# Patient Record
Sex: FEMALE | Race: WHITE | Employment: FULL TIME | URBAN - NONMETROPOLITAN AREA
[De-identification: names, ages, dates, MRNs, and addresses within clinical notes are randomized per-mention and may not be internally consistent; named-entity substitution may affect disease eponyms.]

---

## 2022-10-02 ENCOUNTER — HOSPITAL ENCOUNTER (INPATIENT)
Age: 27
LOS: 3 days | Discharge: HOME | DRG: 552 | End: 2022-10-07
Attending: ORTHOPAEDIC SURGERY | Admitting: ORTHOPAEDIC SURGERY
Payer: COMMERCIAL

## 2022-10-02 ENCOUNTER — APPOINTMENT (OUTPATIENT)
Dept: MRI IMAGING | Age: 27
DRG: 552 | End: 2022-10-02
Payer: COMMERCIAL

## 2022-10-02 DIAGNOSIS — M54.59 INTRACTABLE LOW BACK PAIN: Primary | ICD-10-CM

## 2022-10-02 LAB
ANION GAP SERPL CALCULATED.3IONS-SCNC: 14 MEQ/L (ref 8–16)
BACTERIA: ABNORMAL /HPF
BASOPHILS # BLD: 0.1 %
BASOPHILS ABSOLUTE: 0 THOU/MM3 (ref 0–0.1)
BILIRUBIN URINE: NEGATIVE
BLOOD, URINE: ABNORMAL
BUN BLDV-MCNC: 12 MG/DL (ref 7–22)
C-REACTIVE PROTEIN: 1.93 MG/DL (ref 0–1)
CALCIUM SERPL-MCNC: 9 MG/DL (ref 8.5–10.5)
CASTS 2: ABNORMAL /LPF
CASTS UA: ABNORMAL /LPF
CHARACTER, URINE: CLEAR
CHLORIDE BLD-SCNC: 100 MEQ/L (ref 98–111)
CO2: 22 MEQ/L (ref 23–33)
COLOR: YELLOW
CREAT SERPL-MCNC: 0.5 MG/DL (ref 0.4–1.2)
CRYSTALS, UA: ABNORMAL
EOSINOPHIL # BLD: 1.3 %
EOSINOPHILS ABSOLUTE: 0.1 THOU/MM3 (ref 0–0.4)
EPITHELIAL CELLS, UA: ABNORMAL /HPF
ERYTHROCYTE [DISTWIDTH] IN BLOOD BY AUTOMATED COUNT: 12.8 % (ref 11.5–14.5)
ERYTHROCYTE [DISTWIDTH] IN BLOOD BY AUTOMATED COUNT: 40.3 FL (ref 35–45)
GFR SERPL CREATININE-BSD FRML MDRD: > 90 ML/MIN/1.73M2
GLUCOSE BLD-MCNC: 171 MG/DL (ref 70–108)
GLUCOSE BLD-MCNC: 300 MG/DL (ref 70–108)
GLUCOSE URINE: NEGATIVE MG/DL
HCT VFR BLD CALC: 39 % (ref 37–47)
HEMOGLOBIN: 13.6 GM/DL (ref 12–16)
IMMATURE GRANS (ABS): 0.04 THOU/MM3 (ref 0–0.07)
IMMATURE GRANULOCYTES: 0.4 %
KETONES, URINE: ABNORMAL
LEUKOCYTE ESTERASE, URINE: NEGATIVE
LYMPHOCYTES # BLD: 15.7 %
LYMPHOCYTES ABSOLUTE: 1.4 THOU/MM3 (ref 1–4.8)
MAGNESIUM: 1.7 MG/DL (ref 1.6–2.4)
MCH RBC QN AUTO: 30.4 PG (ref 26–33)
MCHC RBC AUTO-ENTMCNC: 34.9 GM/DL (ref 32.2–35.5)
MCV RBC AUTO: 87.2 FL (ref 81–99)
MISCELLANEOUS 2: ABNORMAL
MONOCYTES # BLD: 3.4 %
MONOCYTES ABSOLUTE: 0.3 THOU/MM3 (ref 0.4–1.3)
NITRITE, URINE: NEGATIVE
NUCLEATED RED BLOOD CELLS: 0 /100 WBC
OSMOLALITY CALCULATION: 275.7 MOSMOL/KG (ref 275–300)
PH UA: 6.5 (ref 5–9)
PLATELET # BLD: 245 THOU/MM3 (ref 130–400)
PMV BLD AUTO: 9.4 FL (ref 9.4–12.4)
POTASSIUM SERPL-SCNC: 3.9 MEQ/L (ref 3.5–5.2)
PROTEIN UA: ABNORMAL
RBC # BLD: 4.47 MILL/MM3 (ref 4.2–5.4)
RBC URINE: ABNORMAL /HPF
RENAL EPITHELIAL, UA: ABNORMAL
SEDIMENTATION RATE, ERYTHROCYTE: 18 MM/HR (ref 0–20)
SEG NEUTROPHILS: 79.1 %
SEGMENTED NEUTROPHILS ABSOLUTE COUNT: 7.2 THOU/MM3 (ref 1.8–7.7)
SODIUM BLD-SCNC: 136 MEQ/L (ref 135–145)
SPECIFIC GRAVITY, URINE: 1.01 (ref 1–1.03)
UROBILINOGEN, URINE: 1 EU/DL (ref 0–1)
WBC # BLD: 9.1 THOU/MM3 (ref 4.8–10.8)
WBC UA: ABNORMAL /HPF
YEAST: ABNORMAL

## 2022-10-02 PROCEDURE — 36415 COLL VENOUS BLD VENIPUNCTURE: CPT

## 2022-10-02 PROCEDURE — G0378 HOSPITAL OBSERVATION PER HR: HCPCS

## 2022-10-02 PROCEDURE — 6360000002 HC RX W HCPCS: Performed by: PHYSICIAN ASSISTANT

## 2022-10-02 PROCEDURE — 96372 THER/PROPH/DIAG INJ SC/IM: CPT

## 2022-10-02 PROCEDURE — 99285 EMERGENCY DEPT VISIT HI MDM: CPT

## 2022-10-02 PROCEDURE — A9579 GAD-BASE MR CONTRAST NOS,1ML: HCPCS | Performed by: PHYSICIAN ASSISTANT

## 2022-10-02 PROCEDURE — 2580000003 HC RX 258: Performed by: PHYSICIAN ASSISTANT

## 2022-10-02 PROCEDURE — 80048 BASIC METABOLIC PNL TOTAL CA: CPT

## 2022-10-02 PROCEDURE — 81001 URINALYSIS AUTO W/SCOPE: CPT

## 2022-10-02 PROCEDURE — 99253 IP/OBS CNSLTJ NEW/EST LOW 45: CPT | Performed by: PHYSICIAN ASSISTANT

## 2022-10-02 PROCEDURE — 96374 THER/PROPH/DIAG INJ IV PUSH: CPT

## 2022-10-02 PROCEDURE — 96375 TX/PRO/DX INJ NEW DRUG ADDON: CPT

## 2022-10-02 PROCEDURE — 86140 C-REACTIVE PROTEIN: CPT

## 2022-10-02 PROCEDURE — 6360000004 HC RX CONTRAST MEDICATION: Performed by: PHYSICIAN ASSISTANT

## 2022-10-02 PROCEDURE — 85025 COMPLETE CBC W/AUTO DIFF WBC: CPT

## 2022-10-02 PROCEDURE — 72158 MRI LUMBAR SPINE W/O & W/DYE: CPT

## 2022-10-02 PROCEDURE — 82948 REAGENT STRIP/BLOOD GLUCOSE: CPT

## 2022-10-02 PROCEDURE — 93005 ELECTROCARDIOGRAM TRACING: CPT | Performed by: PHYSICIAN ASSISTANT

## 2022-10-02 PROCEDURE — 96361 HYDRATE IV INFUSION ADD-ON: CPT

## 2022-10-02 PROCEDURE — 6370000000 HC RX 637 (ALT 250 FOR IP): Performed by: PHYSICIAN ASSISTANT

## 2022-10-02 PROCEDURE — 85651 RBC SED RATE NONAUTOMATED: CPT

## 2022-10-02 PROCEDURE — 83735 ASSAY OF MAGNESIUM: CPT

## 2022-10-02 RX ORDER — KETOROLAC TROMETHAMINE 30 MG/ML
30 INJECTION, SOLUTION INTRAMUSCULAR; INTRAVENOUS ONCE
Status: COMPLETED | OUTPATIENT
Start: 2022-10-02 | End: 2022-10-02

## 2022-10-02 RX ORDER — PANTOPRAZOLE SODIUM 40 MG/1
40 TABLET, DELAYED RELEASE ORAL
Status: DISCONTINUED | OUTPATIENT
Start: 2022-10-03 | End: 2022-10-07 | Stop reason: HOSPADM

## 2022-10-02 RX ORDER — MORPHINE SULFATE 2 MG/ML
2 INJECTION, SOLUTION INTRAMUSCULAR; INTRAVENOUS
Status: DISCONTINUED | OUTPATIENT
Start: 2022-10-02 | End: 2022-10-03

## 2022-10-02 RX ORDER — OXYCODONE HYDROCHLORIDE AND ACETAMINOPHEN 5; 325 MG/1; MG/1
1 TABLET ORAL EVERY 4 HOURS PRN
Status: DISCONTINUED | OUTPATIENT
Start: 2022-10-02 | End: 2022-10-07 | Stop reason: HOSPADM

## 2022-10-02 RX ORDER — SODIUM CHLORIDE 0.9 % (FLUSH) 0.9 %
5-40 SYRINGE (ML) INJECTION EVERY 12 HOURS SCHEDULED
Status: DISCONTINUED | OUTPATIENT
Start: 2022-10-02 | End: 2022-10-07 | Stop reason: HOSPADM

## 2022-10-02 RX ORDER — MORPHINE SULFATE 4 MG/ML
4 INJECTION, SOLUTION INTRAMUSCULAR; INTRAVENOUS
Status: DISCONTINUED | OUTPATIENT
Start: 2022-10-02 | End: 2022-10-03

## 2022-10-02 RX ORDER — SODIUM CHLORIDE 9 MG/ML
INJECTION, SOLUTION INTRAVENOUS CONTINUOUS
Status: DISCONTINUED | OUTPATIENT
Start: 2022-10-02 | End: 2022-10-07 | Stop reason: HOSPADM

## 2022-10-02 RX ORDER — 0.9 % SODIUM CHLORIDE 0.9 %
1000 INTRAVENOUS SOLUTION INTRAVENOUS ONCE
Status: COMPLETED | OUTPATIENT
Start: 2022-10-02 | End: 2022-10-02

## 2022-10-02 RX ORDER — POLYETHYLENE GLYCOL 3350 17 G/17G
17 POWDER, FOR SOLUTION ORAL DAILY PRN
Status: DISCONTINUED | OUTPATIENT
Start: 2022-10-02 | End: 2022-10-07 | Stop reason: HOSPADM

## 2022-10-02 RX ORDER — DEXAMETHASONE SODIUM PHOSPHATE 4 MG/ML
10 INJECTION, SOLUTION INTRA-ARTICULAR; INTRALESIONAL; INTRAMUSCULAR; INTRAVENOUS; SOFT TISSUE ONCE
Status: COMPLETED | OUTPATIENT
Start: 2022-10-02 | End: 2022-10-02

## 2022-10-02 RX ORDER — SODIUM CHLORIDE 9 MG/ML
INJECTION, SOLUTION INTRAVENOUS PRN
Status: DISCONTINUED | OUTPATIENT
Start: 2022-10-02 | End: 2022-10-07 | Stop reason: HOSPADM

## 2022-10-02 RX ORDER — METOPROLOL SUCCINATE 50 MG/1
50 TABLET, EXTENDED RELEASE ORAL DAILY
COMMUNITY

## 2022-10-02 RX ORDER — AMLODIPINE BESYLATE 5 MG/1
5 TABLET ORAL DAILY
Status: DISCONTINUED | OUTPATIENT
Start: 2022-10-03 | End: 2022-10-07 | Stop reason: HOSPADM

## 2022-10-02 RX ORDER — ONDANSETRON 2 MG/ML
4 INJECTION INTRAMUSCULAR; INTRAVENOUS EVERY 6 HOURS PRN
Status: DISCONTINUED | OUTPATIENT
Start: 2022-10-02 | End: 2022-10-07 | Stop reason: HOSPADM

## 2022-10-02 RX ORDER — OXYCODONE HYDROCHLORIDE AND ACETAMINOPHEN 5; 325 MG/1; MG/1
1 TABLET ORAL EVERY 6 HOURS PRN
Status: ON HOLD | COMMUNITY
End: 2022-10-07 | Stop reason: HOSPADM

## 2022-10-02 RX ORDER — METOPROLOL SUCCINATE 50 MG/1
50 TABLET, EXTENDED RELEASE ORAL DAILY
Status: DISCONTINUED | OUTPATIENT
Start: 2022-10-03 | End: 2022-10-07 | Stop reason: HOSPADM

## 2022-10-02 RX ORDER — LISINOPRIL 10 MG/1
10 TABLET ORAL DAILY
Status: DISCONTINUED | OUTPATIENT
Start: 2022-10-03 | End: 2022-10-07 | Stop reason: HOSPADM

## 2022-10-02 RX ORDER — AMLODIPINE BESYLATE 5 MG/1
5 TABLET ORAL DAILY
COMMUNITY

## 2022-10-02 RX ORDER — ONDANSETRON 4 MG/1
4 TABLET, ORALLY DISINTEGRATING ORAL EVERY 8 HOURS PRN
Status: DISCONTINUED | OUTPATIENT
Start: 2022-10-02 | End: 2022-10-07 | Stop reason: HOSPADM

## 2022-10-02 RX ORDER — OXYCODONE HYDROCHLORIDE AND ACETAMINOPHEN 5; 325 MG/1; MG/1
2 TABLET ORAL EVERY 4 HOURS PRN
Status: DISCONTINUED | OUTPATIENT
Start: 2022-10-02 | End: 2022-10-07 | Stop reason: HOSPADM

## 2022-10-02 RX ORDER — LISINOPRIL 10 MG/1
10 TABLET ORAL DAILY
COMMUNITY

## 2022-10-02 RX ORDER — OMEPRAZOLE 20 MG/1
20 CAPSULE, DELAYED RELEASE ORAL DAILY
COMMUNITY

## 2022-10-02 RX ORDER — SODIUM CHLORIDE 0.9 % (FLUSH) 0.9 %
5-40 SYRINGE (ML) INJECTION PRN
Status: DISCONTINUED | OUTPATIENT
Start: 2022-10-02 | End: 2022-10-07 | Stop reason: HOSPADM

## 2022-10-02 RX ORDER — ENOXAPARIN SODIUM 100 MG/ML
40 INJECTION SUBCUTANEOUS 2 TIMES DAILY
Status: DISCONTINUED | OUTPATIENT
Start: 2022-10-02 | End: 2022-10-07 | Stop reason: HOSPADM

## 2022-10-02 RX ORDER — CYCLOBENZAPRINE HCL 10 MG
10 TABLET ORAL 3 TIMES DAILY PRN
Status: DISCONTINUED | OUTPATIENT
Start: 2022-10-02 | End: 2022-10-03

## 2022-10-02 RX ORDER — MORPHINE SULFATE 4 MG/ML
4 INJECTION, SOLUTION INTRAMUSCULAR; INTRAVENOUS ONCE
Status: COMPLETED | OUTPATIENT
Start: 2022-10-02 | End: 2022-10-02

## 2022-10-02 RX ORDER — DIAZEPAM 5 MG/ML
5 INJECTION, SOLUTION INTRAMUSCULAR; INTRAVENOUS ONCE
Status: COMPLETED | OUTPATIENT
Start: 2022-10-02 | End: 2022-10-02

## 2022-10-02 RX ADMIN — CYCLOBENZAPRINE 10 MG: 10 TABLET, FILM COATED ORAL at 21:28

## 2022-10-02 RX ADMIN — DIAZEPAM 5 MG: 5 INJECTION, SOLUTION INTRAMUSCULAR; INTRAVENOUS at 13:53

## 2022-10-02 RX ADMIN — GADOTERIDOL 20 ML: 279.3 INJECTION, SOLUTION INTRAVENOUS at 15:59

## 2022-10-02 RX ADMIN — KETOROLAC TROMETHAMINE 30 MG: 30 INJECTION, SOLUTION INTRAMUSCULAR; INTRAVENOUS at 13:53

## 2022-10-02 RX ADMIN — SODIUM CHLORIDE: 9 INJECTION, SOLUTION INTRAVENOUS at 19:10

## 2022-10-02 RX ADMIN — OXYCODONE AND ACETAMINOPHEN 2 TABLET: 5; 325 TABLET ORAL at 21:28

## 2022-10-02 RX ADMIN — ENOXAPARIN SODIUM 40 MG: 100 INJECTION SUBCUTANEOUS at 20:20

## 2022-10-02 RX ADMIN — DEXAMETHASONE SODIUM PHOSPHATE 10 MG: 4 INJECTION, SOLUTION INTRAMUSCULAR; INTRAVENOUS at 13:51

## 2022-10-02 RX ADMIN — MORPHINE SULFATE 4 MG: 4 INJECTION, SOLUTION INTRAMUSCULAR; INTRAVENOUS at 17:13

## 2022-10-02 RX ADMIN — SODIUM CHLORIDE 1000 ML: 9 INJECTION, SOLUTION INTRAVENOUS at 13:51

## 2022-10-02 ASSESSMENT — PAIN SCALES - GENERAL
PAINLEVEL_OUTOF10: 6
PAINLEVEL_OUTOF10: 7
PAINLEVEL_OUTOF10: 7
PAINLEVEL_OUTOF10: 4
PAINLEVEL_OUTOF10: 3
PAINLEVEL_OUTOF10: 6
PAINLEVEL_OUTOF10: 4

## 2022-10-02 ASSESSMENT — PAIN DESCRIPTION - DESCRIPTORS
DESCRIPTORS: SHOOTING
DESCRIPTORS: SHOOTING

## 2022-10-02 ASSESSMENT — PAIN DESCRIPTION - ONSET
ONSET: ON-GOING
ONSET: ON-GOING

## 2022-10-02 ASSESSMENT — PAIN DESCRIPTION - PAIN TYPE
TYPE: ACUTE PAIN
TYPE: ACUTE PAIN

## 2022-10-02 ASSESSMENT — PAIN DESCRIPTION - ORIENTATION
ORIENTATION: LEFT;LOWER
ORIENTATION: LOWER;LEFT
ORIENTATION: LEFT;LOWER

## 2022-10-02 ASSESSMENT — ENCOUNTER SYMPTOMS
VOMITING: 0
DIARRHEA: 0
EYE PAIN: 0
ABDOMINAL PAIN: 0
NAUSEA: 0
SHORTNESS OF BREATH: 0
COUGH: 0
STRIDOR: 0
BACK PAIN: 1

## 2022-10-02 ASSESSMENT — PAIN - FUNCTIONAL ASSESSMENT
PAIN_FUNCTIONAL_ASSESSMENT: 0-10
PAIN_FUNCTIONAL_ASSESSMENT: 0-10
PAIN_FUNCTIONAL_ASSESSMENT: PREVENTS OR INTERFERES SOME ACTIVE ACTIVITIES AND ADLS
PAIN_FUNCTIONAL_ASSESSMENT: 0-10
PAIN_FUNCTIONAL_ASSESSMENT: ACTIVITIES ARE NOT PREVENTED

## 2022-10-02 ASSESSMENT — PAIN DESCRIPTION - LOCATION
LOCATION: BACK
LOCATION: BACK
LOCATION: BACK;LEG

## 2022-10-02 ASSESSMENT — PAIN DESCRIPTION - FREQUENCY
FREQUENCY: INTERMITTENT
FREQUENCY: INTERMITTENT

## 2022-10-02 ASSESSMENT — PAIN DESCRIPTION - DIRECTION
RADIATING_TOWARDS: DOWN LEFT LEG
RADIATING_TOWARDS: LEFT LEG

## 2022-10-02 NOTE — LETTER
Weston Vega  Phone: [phone]    No name on file. October 6, 2022     Patient: Kimberley Mc   YOB: 1995   Date of Visit: 10/2/2022       To Whom It May Concern: It is my medical opinion that Sulma Magana may return to work on 10/10/22. She was admitted to the hospital under the care of Dr. Emmanuel Mojica from the dates 10/2/22 to 10/6/22    If you have any questions or concerns, please don't hesitate to call. Sincerely,        No name on file.

## 2022-10-02 NOTE — ED NOTES
In to update patient on status of MRI, extra pillow provided for comfort.       Jovita Fournier, RN  10/02/22 9617

## 2022-10-02 NOTE — ED TRIAGE NOTES
Pt in through ED lobby. She has had lower left back pain going down her left leg since Tuesday. She has some numbness and tingling in her left leg. She was seen at [facility] ER this morning where they gave her valium, dilaudid, droperidol, and toradol. This helped the pain and brought it from a 9/10 to a 4/10. She was sent home with percocet and methylprednisolone. She was told to follow up with Dr. Cecelia Connolly. She is coming to ER to get the process started with Dr. Cecelia Connolly.  She did have a surgery with [facility] on 2013 for herniated disc at L4, L5, and S1.

## 2022-10-02 NOTE — ED NOTES
Pt ambulatory to the bathroom with no assistance from RN for urine sample      Lindy Balderrama RN  10/02/22 3582

## 2022-10-02 NOTE — ED PROVIDER NOTES
Weston Vega  Phone: [phone]       Chief Complaint   Patient presents with    Back Pain    Leg Pain       Nurses Notes reviewed and I agree except as notedin the HPI. HISTORY OF PRESENT ILLNESS    Marichuy Stewart is a 32 y.o. female who presents complains of low back pain that is been going on since Thursday. She has pain going to her left leg with numbness and tingling. She says that she is having difficulty moving her left foot. She has no bowel or bladder incontinence. She was seen at [facility] emergency department today and was told if he went to see Dr. Jero Crain and her her best bet would be to be to come to the [facility] emergency department. She was discharged home with a Medrol Dosepak and Percocet it was here because of persistent pain. They came in to see if they can get established with Dr. Jero Crain faster. REVIEW OF SYSTEMS     Review of Systems   Constitutional:  Negative for chills and fever. HENT:  Negative for congestion and tinnitus. Eyes:  Negative for pain. Respiratory:  Negative for cough, shortness of breath and stridor. Cardiovascular:  Negative for chest pain and palpitations. Gastrointestinal:  Negative for abdominal pain, diarrhea, nausea and vomiting. Genitourinary:  Negative for dysuria and urgency. Musculoskeletal:  Positive for back pain. Negative for myalgias and neck pain. Skin:  Negative for rash. Neurological:  Negative for dizziness. Psychiatric/Behavioral:  Negative for suicidal ideas. All other systems reviewed and are negative. PAST MEDICAL HISTORY    has a past medical history of Diabetes mellitus ([address] Hypercholesteremia, Hypertension, and Lumbar radiculopathy. SURGICAL HISTORY      has a past surgical history that includes Ovarian cyst surgery (01/01/2005); back surgery (Left, 03/18/2013);  Bastrop tooth extraction (Bilateral); and Tympanostomy tube placement. CURRENT MEDICATIONS       Current Discharge Medication List        CONTINUE these medications which have NOT CHANGED    Details   omeprazole (PRILOSEC) 20 MG delayed release capsule Take 20 mg by mouth Daily      metFORMIN (GLUCOPHAGE) 500 MG tablet Take 500 mg by mouth 2 times daily (with meals)      amLODIPine (NORVASC) 5 MG tablet Take 5 mg by mouth daily      lisinopril (PRINIVIL;ZESTRIL) 10 MG tablet Take 10 mg by mouth daily      metoprolol succinate (TOPROL XL) 50 MG extended release tablet Take 50 mg by mouth daily      oxyCODONE-acetaminophen (PERCOCET) 5-325 MG per tablet Take 1 tablet by mouth every 6 hours as needed for Pain. ALLERGIES     has No Known Allergies. HISTORY     She indicated that her mother is alive. She indicated that her father is alive. She indicated that the status of her maternal grandmother is unknown. She indicated that the status of her maternal grandfather is unknown. She indicated that the status of her paternal grandmother is unknown. She indicated that the status of her maternal uncle is unknown.   family history includes Arthritis in her maternal grandfather; Cancer in her maternal grandmother and paternal grandmother; Diabetes in her maternal grandmother and maternal uncle; Heart Disease in her maternal grandmother; High Blood Pressure in her maternal grandmother; High Cholesterol in her maternal grandmother. SOCIALHISTORY      reports that she quit smoking about 12 years ago. Her smoking use included cigarettes. She has a 6.00 pack-year smoking history. She has never used smokeless tobacco. She reports current alcohol use. She reports that she does not use drugs. PHYSICAL EXAM     INITIAL VITALS:  height is 5' 9.5\" (1.765 m) and weight is 343 lb 4.8 oz (155.7 kg) (abnormal). Her axillary temperature is 97.9 °F (36.6 °C). Her blood pressure is 164/100 (abnormal) and her pulse is 98. Her respiration is 18 and oxygen saturation is 97%. Physical Exam  Vitals and nursing note reviewed. HENT:      Head: Normocephalic and atraumatic. Eyes:      Pupils: Pupils are equal, round, and reactive to light. Neck:      Thyroid: No thyromegaly. Trachea: No tracheal deviation. Cardiovascular:      Rate and Rhythm: Normal rate and regular rhythm. Heart sounds: No murmur heard. No friction rub. Pulmonary:      Effort: Pulmonary effort is normal. No respiratory distress. Breath sounds: Normal breath sounds. No wheezing. Abdominal:      General: Bowel sounds are normal. There is no distension. Palpations: Abdomen is soft. Tenderness: There is no abdominal tenderness. Musculoskeletal:      Cervical back: Neck supple. Comments: Tenderness left paraspinous muscles with good strength and sensation lower extremity. Skin:     General: Skin is warm and dry. Findings: No erythema or rash. Neurological:      Mental Status: She is alert and oriented to person, place, and time. DIFFERENTIAL DIAGNOSIS:   Lumbar strain with radiation to left leg. She is having tachycardia and difficulty with her left foot I will get an MRI of her lumbar spine. DIAGNOSTIC RESULTS     EKG: All EKG's are interpreted by the Emergency Department Physician who either signs or Co-signs this chart in the absence of a cardiologist.      RADIOLOGY: non-plain film images(s) such as CT, Ultrasound and MRI are read by the radiologist.  MRI [address] [place] [address] Dr GOODWIN   Final Result       1. Postoperative changes at L5-S1. Recurrent 12 mm ventral and left-sided disc extrusion at this level resulting in mild-to-moderate canal, moderate to severe left and mild-to-moderate right-sided foraminal stenosis. 2. Possible postoperative changes at L4-5. There is a 5.2 mm ventral and left-sided extradural defect resulting in mild-to-moderate canal, moderate left and mild-to-moderate right-sided foraminal stenosis.    3. 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   4. There is mild-to-moderate canal and bilateral foraminal stenosis at L3-4.   5. There is mild canal and mild-to-moderate bilateral foraminal stenosis at L2-3.   6. There is degenerative change involving the sacroiliac joints bilaterally. **This report has been created using voice recognition software. It may contain minor errors which are inherent in voice recognition technology. **      Final report electronically signed by DR Reid Berad on 10/3/2022 8:51 AM            LABS:   Labs Reviewed   CBC WITH AUTO DIFFERENTIAL - Abnormal; Notable for the following components:       Result Value    Monocytes Absolute 0.3 (*)     All other components within normal limits   BASIC METABOLIC PANEL - Abnormal; Notable for the following components:    CO2 22 (*)     Glucose 171 (*)     All other components within normal limits   C-REACTIVE PROTEIN - Abnormal; Notable for the following components:    CRP 1.93 (*)     All other components within normal limits   URINE WITH REFLEXED MICRO - Abnormal; Notable for the following components:    Ketones, Urine TRACE (*)     Blood, Urine TRACE (*)     Protein, UA TRACE (*)     All other components within normal limits   CBC WITH AUTO DIFFERENTIAL - Abnormal; Notable for the following components:    MPV 9.3 (*)     All other components within normal limits   BASIC METABOLIC PANEL W/ REFLEX TO MG FOR LOW K - Abnormal; Notable for the following components:    CO2 22 (*)     Glucose 169 (*)     All other components within normal limits   POCT GLUCOSE - Abnormal; Notable for the following components:    POC Glucose 300 (*)     All other components within normal limits   POCT GLUCOSE - Abnormal; Notable for the following components:    POC Glucose 174 (*)     All other components within normal limits   POCT GLUCOSE - Abnormal; Notable for the following components:    POC Glucose 175 (*)     All other components within normal limits   POCT GLUCOSE - Abnormal; Notable for the following components:    POC Glucose 193 (*)     All other components within normal limits   POCT GLUCOSE - Abnormal; Notable for the following components:    POC Glucose 192 (*)     All other components within normal limits   POCT GLUCOSE - Abnormal; Notable for the following components:    POC Glucose 147 (*)     All other components within normal limits   POCT GLUCOSE - Abnormal; Notable for the following components:    POC Glucose 145 (*)     All other components within normal limits   POCT GLUCOSE - Abnormal; Notable for the following components:    POC Glucose 137 (*)     All other components within normal limits   POCT GLUCOSE - Abnormal; Notable for the following components:    POC Glucose 157 (*)     All other components within normal limits   POCT GLUCOSE - Abnormal; Notable for the following components:    POC Glucose 130 (*)     All other components within normal limits   POCT GLUCOSE - Abnormal; Notable for the following components:    POC Glucose 132 (*)     All other components within normal limits   POCT GLUCOSE - Abnormal; Notable for the following components:    POC Glucose 127 (*)     All other components within normal limits   MAGNESIUM   SEDIMENTATION RATE   ANION GAP   OSMOLALITY   GLOMERULAR FILTRATION RATE, ESTIMATED   ANION GAP   GLOMERULAR FILTRATION RATE, ESTIMATED       EMERGENCY DEPARTMENT COURSE:   :    Vitals:    10/05/22 0351 10/05/22 0830 10/05/22 1715 10/05/22 1952   BP: (!) 151/120 (!) 146/90 (!) 156/97 (!) 164/100   Pulse: (!) 105 100 97 98   Resp: 18 16 16 18   Temp: 97.5 °F (36.4 °C) 97.9 °F (36.6 °C) 97.9 °F (36.6 °C)    TempSrc: Oral Oral Axillary    SpO2: 97% 97% 98% 97%   Weight:       Height:         Patient was seen history physical exam was performed. Patient was given Toradol, Decadron, and Valium 5 mg IV. Patient was given IV fluids. Patient still having some significant pain.   I did consult the patient's orthopedic spine surgeon and he did offer admission for control and epidural injection. Patient tachycardia did improve somewhat but then got worse and we do feel is pain related. Patient is go to be admitted. See disposition below    CRITICAL CARE:  None    CONSULTS:  Dr Kohli Dec:  None    FINAL IMPRESSION      1. Intractable low back pain          DISPOSITION/PLAN   Admit    PATIENT REFERRED TO:  No follow-up provider specified.     DISCHARGE MEDICATIONS:  Current Discharge Medication List          (Please note that portions of this note were completed with a voice recognitionprogram.  Efforts were made to edit the dictations but occasionally words are mis-transcribed.)    Lethaniel Frankel, PA Lethaniel Frankel, PA  10/02/22 [address] [place] [address], PA  10/05/22 2011

## 2022-10-02 NOTE — CONSULTS
Hospitalist Consult Note        Patient:  Zenobia Riggs  YOB: 1995  Date of Service: 10/2/2022  MRN: [id number]   Acct:  [de-identified]   Primary Care Physician: Ethan Castellanos DO    Chief Complaint:  acute on chronic low back pain  Reason for consult  pre op risk assessment    Date of Service: Pt seen/examined in consultation on 10/2/2022     History Of Present Illness:      Marcell Newness y.o. female who we are asked to see/evaluate by Luh Mccain MD for medical management of hypertension and obesity. Patient presents to the ED with gradually worsening low back pain with radiation down the left leg. Patient states she has had low back pain since she was 10years old. Assessment and Plan:-  Acute on chronic low back pain:   Primary to manage  Essential hypertension:  Continue home medications  Monitor for hypotension with opiate administration  Morbid Obesity  Calorie control diet  Patient states she was tested for sleep apnea and doesn't have it  Pre op risk assessment:  Revised Cardiac Risk Index is 0 or Class I Risk  NSQIP indicates she is below average risk for post operative complications  Suggest early ambulation, incentive spirometry, med nebs, and aggressive DVT prophylaxis post operatively  Patient is a low risk for surgery. Past Medical History:        Diagnosis Date    Diabetes mellitus ([address]     Hypercholesteremia     Hypertension        Past Surgical History:        Procedure Laterality Date    BACK SURGERY Left 03/18/2013    Left L4-5 & L5-S1 microdisectomy    OVARIAN CYST SURGERY  01/01/2005    & CYST OFF FALLIOPIAN TUBE    TYMPANOSTOMY TUBE PLACEMENT      WISDOM TOOTH EXTRACTION Bilateral        Home Medications:   No current facility-administered medications on file prior to encounter.      Current Outpatient Medications on File Prior to Encounter   Medication Sig Dispense Refill    omeprazole (PRILOSEC) 20 MG delayed release capsule Take 20 mg by mouth Daily      metFORMIN (GLUCOPHAGE) 500 MG tablet Take 500 mg by mouth 2 times daily (with meals)      amLODIPine (NORVASC) 5 MG tablet Take 5 mg by mouth daily      lisinopril (PRINIVIL;ZESTRIL) 10 MG tablet Take 10 mg by mouth daily      metoprolol succinate (TOPROL XL) 50 MG extended release tablet Take 50 mg by mouth daily      oxyCODONE-acetaminophen (PERCOCET) 5-325 MG per tablet Take 1 tablet by mouth every 6 hours as needed for Pain. Allergies:    Patient has no known allergies. Social History:    reports that she quit smoking about 12 years ago. Her smoking use included cigarettes. She has a 6.00 pack-year smoking history. She has never used smokeless tobacco. She reports current alcohol use. She reports that she does not use drugs. Family History:       Problem Relation Age of Onset    Diabetes Maternal Uncle     Cancer Maternal Grandmother     Diabetes Maternal Grandmother     Heart Disease Maternal Grandmother     High Blood Pressure Maternal Grandmother     High Cholesterol Maternal Grandmother     Arthritis Maternal Grandfather     Cancer Paternal Grandmother        Diet:  ADULT DIET; Regular; 5 carb choices (75 gm/meal)    Review of systems:   Pertinent positives as noted in the HPI. All other systems reviewed and negative. PHYSICAL EXAM:  BP (!) 163/106   Pulse (!) 113   Temp 98.2 °F (36.8 °C) (Oral)   Resp 20   Ht 5' 9.5\" (1.765 m)   Wt (!) 343 lb 4.8 oz (155.7 kg)   SpO2 95%   BMI 49.97 kg/m²   General appearance: No apparent distress, appears stated age and cooperative. Obese  HEENT: Normal cephalic, atraumatic without obvious deformity. Pupils equal, round, and reactive to light. Extra ocular muscles intact. Conjunctivae/corneas clear. Neck: Supple, with full range of motion. No jugular venous distention. Trachea midline. Respiratory:  Normal respiratory effort. Clear to auscultation, bilaterally without Rales/Wheezes/Rhonchi.   Cardiovascular: Regular rate and rhythm with normal S1/S2 without murmurs, rubs or gallops. Abdomen: Soft, non-tender, non-distended with normal bowel sounds. Musculoskeletal:  No clubbing, cyanosis or edema bilaterally. Skin: Skin color, texture, turgor normal.  No rashes or lesions. Neurologic:  Neurovascularly intact without any focal sensory/motor deficits. Cranial nerves: II-XII intact, grossly non-focal.  Psychiatric: Alert and oriented, thought content appropriate, normal insight  Capillary Refill: Brisk,< 3 seconds   Peripheral Pulses: +2 palpable, equal bilaterally     Labs:   Recent Labs     10/02/22  1331   WBC 9.1   HGB 13.6   HCT 39.0        Recent Labs     10/02/22  1331      K 3.9      CO2 22*   BUN 12   CREATININE 0.5   CALCIUM 9.0     No results for input(s): AST, ALT, BILIDIR, BILITOT, ALKPHOS in the last 72 hours. No results for input(s): INR in the last 72 hours. No results for input(s): [place]ne Knock in the last 72 hours. Urinalysis:    Lab Results   Component Value Date/Time    NITRU NEGATIVE 10/02/2022 04:55 PM    WBCUA 0-2 10/02/2022 04:55 PM    BACTERIA NONE SEEN 10/02/2022 04:55 PM    RBCUA 0-2 10/02/2022 04:55 PM    BLOODU TRACE 10/02/2022 04:55 PM    GLUCOSEU NEGATIVE 10/02/2022 04:55 PM       Radiology:   MRI LUMBAR SPINE W WO CONTRAST           MRI LUMBAR SPINE W WO CONTRAST    Result Date: 10/2/2022  WET READ: At L5-S1, a moderate central disc extrusion is seen measuring 1.2 cm in AP dimension. Moderate central canal narrowing. There is impingement on the descending spinal nerves. There is contact with the left S1 nerve root. Moderate left neural foraminal narrowing. Mild right neuroforaminal narrowing. At L4-L5: There is broad base disc bulge with a left central disc protrusion. There is contact of the left L5 nerve root. Mild right neuroforaminal narrowing. Moderate left neuroforaminal narrowing. Mild ligamentum flavum hypertrophy. Mild central canal narrowing.  At L3-L4: There is ligamentum flavum hypertrophy and mild broad-based disc bulge. Mild central canal narrowing. No significant neuroforamina narrowing. At L2-L3: There is mild broad-based disc bulge, ligamenta flava hypertrophy. Mild central canal narrowing. No significant neuroforamina narrowing. Mild multilevel degenerative changes of the lumbar spine are noted. No abnormal enhancement is seen. This examination will be formally read by one of the neuro radiologists tomorrow. Please refer to that report. Keisha Borges ON 10/2/2022 4:04 PM. **This report has been created using voice recognition software. It may contain minor errors which are inherent in voice recognition technology. **         EKG:  pending this encounter      Sonny Cruz. HOSPITALIST WILL FOLLOW THE PATIENT PRN. RECONSULT FOR ANY URGENT NEEDS.     Electronically signed by Verónica Valle PA-C on 10/2/2022 at 7:50 PM

## 2022-10-02 NOTE — ED NOTES
ED to inpatient nurses report    Chief Complaint   Patient presents with    Back Pain    Leg Pain      Present to ED from home  LOC: alert and orientated to name, place, date  Vital signs   Vitals:    10/02/22 1303 10/02/22 1427 10/02/22 1615 10/02/22 1719   BP: (!) 149/101 130/85 (!) 150/98 (!) 164/93   Pulse: (!) 108 (!) 105 (!) 109 (!) 115   Resp: 18 18 18 18   Temp: 97.7 °F (36.5 °C)      TempSrc: Oral      SpO2: 94% 95% 96% 97%      Oxygen Baseline room air     Current needs required none Bipap/Cpap No  LDAs:   Peripheral IV 10/02/22 Right Antecubital (Active)   Site Assessment Clean, dry & intact 10/02/22 1720   Line Status Infusing 10/02/22 1720   Phlebitis Assessment No symptoms 10/02/22 1720   Infiltration Assessment 0 10/02/22 1720   Dressing Status Clean, dry & intact 10/02/22 1720     Mobility: Independent  Pending ED orders: none  Present condition: Pt medicated for pain with morphine. Pt has numbness and tingling in legs. VSS.    Person of contract, phone number   Our promise was given to patient    C-SSRS    Swallow Screening    Preferred Language: [place]     Electronically signed by Za Pimentel RN on 10/2/2022 at 5:21 PM       Za Pimentel RN  10/02/22 4005

## 2022-10-02 NOTE — ED NOTES
ED nurse-to-nurse bedside report    Chief Complaint   Patient presents with    Back Pain    Leg Pain      LOC: alert and orientated to name, place, date  Vital signs   Vitals:    10/02/22 1303 10/02/22 1427   BP: (!) 149/101 130/85   Pulse: (!) 108 (!) 105   Resp: 18 18   Temp: 97.7 °F (36.5 °C)    TempSrc: Oral    SpO2: 94% 95%      Pain:    Pain Interventions: Medication, Positioning  Pain Goal: 4  Oxygen: No    Current needs required Room Air   Telemetry: No  LDAs:   Peripheral IV 10/02/22 Right Antecubital (Active)     Continuous Infusions:   Mobility: Independent  Kelly Fall Risk Score: No flowsheet data found.   Fall Interventions: Hourly Rounding, Side Rails x 2  Report given to: Xavi Knox RN  10/02/22 3292

## 2022-10-02 NOTE — ED NOTES
Pt. Resting in bed with even and unlabored respirations. Pt. Back to room from MRI. Pt. Updated about plan of care and treatment. Family at bedside. Pt. Has no further concerns, questions or needs at this time. Call light within reach.         Za Pimentel RN  10/02/22 0778

## 2022-10-03 PROBLEM — M48.062 SPINAL STENOSIS OF LUMBAR REGION WITH NEUROGENIC CLAUDICATION: Status: ACTIVE | Noted: 2022-10-03

## 2022-10-03 PROBLEM — G89.4 CHRONIC PAIN SYNDROME: Status: ACTIVE | Noted: 2022-10-03

## 2022-10-03 PROBLEM — R52 ACUTE PAIN: Status: ACTIVE | Noted: 2022-10-03

## 2022-10-03 PROBLEM — M54.16 LUMBAR RADICULOPATHY: Status: ACTIVE | Noted: 2022-10-03

## 2022-10-03 LAB
ANION GAP SERPL CALCULATED.3IONS-SCNC: 13 MEQ/L (ref 8–16)
BASOPHILS # BLD: 0.1 %
BASOPHILS ABSOLUTE: 0 THOU/MM3 (ref 0–0.1)
BUN BLDV-MCNC: 9 MG/DL (ref 7–22)
CALCIUM SERPL-MCNC: 8.8 MG/DL (ref 8.5–10.5)
CHLORIDE BLD-SCNC: 105 MEQ/L (ref 98–111)
CO2: 22 MEQ/L (ref 23–33)
CREAT SERPL-MCNC: 0.5 MG/DL (ref 0.4–1.2)
EKG ATRIAL RATE: 117 BPM
EKG P AXIS: 47 DEGREES
EKG P-R INTERVAL: 160 MS
EKG Q-T INTERVAL: 350 MS
EKG QRS DURATION: 100 MS
EKG QTC CALCULATION (BAZETT): 488 MS
EKG R AXIS: 92 DEGREES
EKG T AXIS: 11 DEGREES
EKG VENTRICULAR RATE: 117 BPM
EOSINOPHIL # BLD: 1 %
EOSINOPHILS ABSOLUTE: 0.1 THOU/MM3 (ref 0–0.4)
ERYTHROCYTE [DISTWIDTH] IN BLOOD BY AUTOMATED COUNT: 12.7 % (ref 11.5–14.5)
ERYTHROCYTE [DISTWIDTH] IN BLOOD BY AUTOMATED COUNT: 41.4 FL (ref 35–45)
GFR SERPL CREATININE-BSD FRML MDRD: > 90 ML/MIN/1.73M2
GLUCOSE BLD-MCNC: 169 MG/DL (ref 70–108)
GLUCOSE BLD-MCNC: 174 MG/DL (ref 70–108)
GLUCOSE BLD-MCNC: 175 MG/DL (ref 70–108)
GLUCOSE BLD-MCNC: 192 MG/DL (ref 70–108)
GLUCOSE BLD-MCNC: 193 MG/DL (ref 70–108)
HCT VFR BLD CALC: 38.3 % (ref 37–47)
HEMOGLOBIN: 13.1 GM/DL (ref 12–16)
IMMATURE GRANS (ABS): 0.05 THOU/MM3 (ref 0–0.07)
IMMATURE GRANULOCYTES: 0.6 %
LYMPHOCYTES # BLD: 21.9 %
LYMPHOCYTES ABSOLUTE: 1.9 THOU/MM3 (ref 1–4.8)
MCH RBC QN AUTO: 30.3 PG (ref 26–33)
MCHC RBC AUTO-ENTMCNC: 34.2 GM/DL (ref 32.2–35.5)
MCV RBC AUTO: 88.7 FL (ref 81–99)
MONOCYTES # BLD: 4.4 %
MONOCYTES ABSOLUTE: 0.4 THOU/MM3 (ref 0.4–1.3)
NUCLEATED RED BLOOD CELLS: 0 /100 WBC
PLATELET # BLD: 242 THOU/MM3 (ref 130–400)
PMV BLD AUTO: 9.3 FL (ref 9.4–12.4)
POTASSIUM REFLEX MAGNESIUM: 4 MEQ/L (ref 3.5–5.2)
RBC # BLD: 4.32 MILL/MM3 (ref 4.2–5.4)
SEG NEUTROPHILS: 72 %
SEGMENTED NEUTROPHILS ABSOLUTE COUNT: 6.3 THOU/MM3 (ref 1.8–7.7)
SODIUM BLD-SCNC: 140 MEQ/L (ref 135–145)
WBC # BLD: 8.8 THOU/MM3 (ref 4.8–10.8)

## 2022-10-03 PROCEDURE — 99232 SBSQ HOSP IP/OBS MODERATE 35: CPT | Performed by: PHYSICIAN ASSISTANT

## 2022-10-03 PROCEDURE — 99223 1ST HOSP IP/OBS HIGH 75: CPT | Performed by: NURSE PRACTITIONER

## 2022-10-03 PROCEDURE — G0378 HOSPITAL OBSERVATION PER HR: HCPCS

## 2022-10-03 PROCEDURE — 93010 ELECTROCARDIOGRAM REPORT: CPT | Performed by: NUCLEAR MEDICINE

## 2022-10-03 PROCEDURE — 6370000000 HC RX 637 (ALT 250 FOR IP): Performed by: PHYSICIAN ASSISTANT

## 2022-10-03 PROCEDURE — 6360000002 HC RX W HCPCS: Performed by: PHYSICIAN ASSISTANT

## 2022-10-03 PROCEDURE — 80048 BASIC METABOLIC PNL TOTAL CA: CPT

## 2022-10-03 PROCEDURE — 36415 COLL VENOUS BLD VENIPUNCTURE: CPT

## 2022-10-03 PROCEDURE — 6370000000 HC RX 637 (ALT 250 FOR IP): Performed by: NURSE PRACTITIONER

## 2022-10-03 PROCEDURE — 82948 REAGENT STRIP/BLOOD GLUCOSE: CPT

## 2022-10-03 PROCEDURE — 85025 COMPLETE CBC W/AUTO DIFF WBC: CPT

## 2022-10-03 PROCEDURE — 96376 TX/PRO/DX INJ SAME DRUG ADON: CPT

## 2022-10-03 PROCEDURE — 2580000003 HC RX 258: Performed by: PHYSICIAN ASSISTANT

## 2022-10-03 RX ORDER — HYDROXYZINE PAMOATE 50 MG/1
50 CAPSULE ORAL 3 TIMES DAILY PRN
Status: DISCONTINUED | OUTPATIENT
Start: 2022-10-03 | End: 2022-10-07 | Stop reason: HOSPADM

## 2022-10-03 RX ORDER — INSULIN LISPRO 100 [IU]/ML
0-8 INJECTION, SOLUTION INTRAVENOUS; SUBCUTANEOUS
Status: DISCONTINUED | OUTPATIENT
Start: 2022-10-03 | End: 2022-10-07 | Stop reason: HOSPADM

## 2022-10-03 RX ORDER — HYDRALAZINE HYDROCHLORIDE 20 MG/ML
10 INJECTION INTRAMUSCULAR; INTRAVENOUS EVERY 6 HOURS PRN
Status: DISCONTINUED | OUTPATIENT
Start: 2022-10-03 | End: 2022-10-07 | Stop reason: HOSPADM

## 2022-10-03 RX ORDER — DEXTROSE MONOHYDRATE 100 MG/ML
INJECTION, SOLUTION INTRAVENOUS CONTINUOUS PRN
Status: DISCONTINUED | OUTPATIENT
Start: 2022-10-03 | End: 2022-10-07 | Stop reason: HOSPADM

## 2022-10-03 RX ORDER — BACLOFEN 10 MG/1
10 TABLET ORAL 3 TIMES DAILY
Status: DISCONTINUED | OUTPATIENT
Start: 2022-10-03 | End: 2022-10-07 | Stop reason: HOSPADM

## 2022-10-03 RX ORDER — IBUPROFEN 600 MG/1
600 TABLET ORAL EVERY 6 HOURS PRN
Status: DISCONTINUED | OUTPATIENT
Start: 2022-10-03 | End: 2022-10-03

## 2022-10-03 RX ORDER — DOCUSATE SODIUM 100 MG/1
100 CAPSULE, LIQUID FILLED ORAL 2 TIMES DAILY
Status: DISCONTINUED | OUTPATIENT
Start: 2022-10-03 | End: 2022-10-07 | Stop reason: HOSPADM

## 2022-10-03 RX ORDER — PREGABALIN 50 MG/1
50 CAPSULE ORAL 2 TIMES DAILY
Status: DISCONTINUED | OUTPATIENT
Start: 2022-10-03 | End: 2022-10-07 | Stop reason: HOSPADM

## 2022-10-03 RX ORDER — INSULIN LISPRO 100 [IU]/ML
0-4 INJECTION, SOLUTION INTRAVENOUS; SUBCUTANEOUS NIGHTLY
Status: DISCONTINUED | OUTPATIENT
Start: 2022-10-03 | End: 2022-10-07 | Stop reason: HOSPADM

## 2022-10-03 RX ADMIN — CYCLOBENZAPRINE 10 MG: 10 TABLET, FILM COATED ORAL at 06:22

## 2022-10-03 RX ADMIN — HYDRALAZINE HYDROCHLORIDE 10 MG: 20 INJECTION INTRAMUSCULAR; INTRAVENOUS at 14:58

## 2022-10-03 RX ADMIN — OXYCODONE AND ACETAMINOPHEN 2 TABLET: 5; 325 TABLET ORAL at 20:23

## 2022-10-03 RX ADMIN — OXYCODONE AND ACETAMINOPHEN 2 TABLET: 5; 325 TABLET ORAL at 12:03

## 2022-10-03 RX ADMIN — AMLODIPINE BESYLATE 5 MG: 5 TABLET ORAL at 07:49

## 2022-10-03 RX ADMIN — LISINOPRIL 10 MG: 10 TABLET ORAL at 07:49

## 2022-10-03 RX ADMIN — PANTOPRAZOLE SODIUM 40 MG: 40 TABLET, DELAYED RELEASE ORAL at 06:22

## 2022-10-03 RX ADMIN — SODIUM CHLORIDE, PRESERVATIVE FREE 10 ML: 5 INJECTION INTRAVENOUS at 20:24

## 2022-10-03 RX ADMIN — SODIUM CHLORIDE: 9 INJECTION, SOLUTION INTRAVENOUS at 01:28

## 2022-10-03 RX ADMIN — DICLOFENAC SODIUM 4 G: 10 GEL TOPICAL at 14:58

## 2022-10-03 RX ADMIN — METOPROLOL SUCCINATE 50 MG: 50 TABLET, FILM COATED, EXTENDED RELEASE ORAL at 07:49

## 2022-10-03 RX ADMIN — HYDROXYZINE PAMOATE 50 MG: 50 CAPSULE ORAL at 13:12

## 2022-10-03 RX ADMIN — IBUPROFEN 600 MG: 600 TABLET, FILM COATED ORAL at 14:37

## 2022-10-03 RX ADMIN — OXYCODONE AND ACETAMINOPHEN 2 TABLET: 5; 325 TABLET ORAL at 01:29

## 2022-10-03 RX ADMIN — PREGABALIN 50 MG: 50 CAPSULE ORAL at 14:58

## 2022-10-03 RX ADMIN — BACLOFEN 10 MG: 10 TABLET ORAL at 20:23

## 2022-10-03 RX ADMIN — SODIUM CHLORIDE: 9 INJECTION, SOLUTION INTRAVENOUS at 10:33

## 2022-10-03 RX ADMIN — PREGABALIN 50 MG: 50 CAPSULE ORAL at 20:23

## 2022-10-03 RX ADMIN — OXYCODONE AND ACETAMINOPHEN 2 TABLET: 5; 325 TABLET ORAL at 16:17

## 2022-10-03 RX ADMIN — OXYCODONE AND ACETAMINOPHEN 1 TABLET: 5; 325 TABLET ORAL at 07:47

## 2022-10-03 RX ADMIN — BACLOFEN 10 MG: 10 TABLET ORAL at 14:58

## 2022-10-03 RX ADMIN — MORPHINE SULFATE 4 MG: 4 INJECTION, SOLUTION INTRAMUSCULAR; INTRAVENOUS at 10:27

## 2022-10-03 ASSESSMENT — PAIN SCALES - GENERAL
PAINLEVEL_OUTOF10: 8
PAINLEVEL_OUTOF10: 8
PAINLEVEL_OUTOF10: 6
PAINLEVEL_OUTOF10: 6
PAINLEVEL_OUTOF10: 5
PAINLEVEL_OUTOF10: 9
PAINLEVEL_OUTOF10: 7

## 2022-10-03 ASSESSMENT — ENCOUNTER SYMPTOMS
NAUSEA: 0
BACK PAIN: 1
DIARRHEA: 0
SHORTNESS OF BREATH: 0
CHEST TIGHTNESS: 0
VOMITING: 0
CONSTIPATION: 0
COUGH: 0

## 2022-10-03 ASSESSMENT — PAIN DESCRIPTION - DESCRIPTORS
DESCRIPTORS: SHARP
DESCRIPTORS: SORE;THROBBING
DESCRIPTORS: ACHING
DESCRIPTORS: SHARP

## 2022-10-03 ASSESSMENT — PAIN DESCRIPTION - ONSET: ONSET: ON-GOING

## 2022-10-03 ASSESSMENT — PAIN DESCRIPTION - ORIENTATION
ORIENTATION: LEFT
ORIENTATION: MID
ORIENTATION: MID;LOWER
ORIENTATION: MID

## 2022-10-03 ASSESSMENT — PAIN DESCRIPTION - LOCATION
LOCATION: BACK
LOCATION: LEG

## 2022-10-03 ASSESSMENT — PAIN - FUNCTIONAL ASSESSMENT: PAIN_FUNCTIONAL_ASSESSMENT: ACTIVITIES ARE NOT PREVENTED

## 2022-10-03 ASSESSMENT — PAIN DESCRIPTION - PAIN TYPE: TYPE: ACUTE PAIN

## 2022-10-03 ASSESSMENT — PAIN DESCRIPTION - FREQUENCY: FREQUENCY: INTERMITTENT

## 2022-10-03 NOTE — PLAN OF CARE
Problem: Discharge Planning  Goal: Discharge to home or other facility with appropriate resources  Outcome: Progressing  Flowsheets  Taken 10/3/2022 0036 by Kelli Ahumada, RN  Discharge to home or other facility with appropriate resources:   Identify barriers to discharge with patient and caregiver   Arrange for needed discharge resources and transportation as appropriate   Identify discharge learning needs (meds, wound care, etc)   Refer to discharge planning if patient needs post-hospital services based on physician order or complex needs related to functional status, cognitive ability or social support system  Taken 10/2/2022 1812 by Saman Salazar RN  Discharge to home or other facility with appropriate resources:   Identify barriers to discharge with patient and caregiver   Identify discharge learning needs (meds, wound care, etc)   Refer to discharge planning if patient needs post-hospital services based on physician order or complex needs related to functional status, cognitive ability or social support system     Problem: Pain  Goal: Verbalizes/displays adequate comfort level or baseline comfort level  Outcome: Progressing  Flowsheets (Taken 10/3/2022 0036)  Verbalizes/displays adequate comfort level or baseline comfort level:   Encourage patient to monitor pain and request assistance   Assess pain using appropriate pain scale   Administer analgesics based on type and severity of pain and evaluate response   Implement non-pharmacological measures as appropriate and evaluate response     Problem: Safety - Adult  Goal: Free from fall injury  Outcome: Progressing  Flowsheets  Taken 10/3/2022 0036  Free From Fall Injury: Instruct family/caregiver on patient safety  Taken 10/3/2022 0034  Free From Fall Injury: Instruct family/caregiver on patient safety     Problem: ABCDS Injury Assessment  Goal: Absence of physical injury  Outcome: Progressing  Flowsheets  Taken 10/3/2022 0036  Absence of Physical Injury: Implement safety measures based on patient assessment  Taken 10/3/2022 0034  Absence of Physical Injury: Implement safety measures based on patient assessment. Care plan reviewed with patient. Patient verbalizes understanding of the plan of care and contribute to goal setting.

## 2022-10-03 NOTE — PROGRESS NOTES
Hospitalist Progress Note      Patient:  Eli Pierre    Unit/Bed:7K-26/026-A  YOB: 1995  MRN: [id number]   Acct: [de-identified]   PCP: Erendira Perry DO  Date of Admission: 10/2/2022    Assessment/Plan:    Lumbar Pain, acute on chronic: Spine primary. Pain Management consulted. MRI does not show cauda equina. Plan for possible SURAJ. Essential HTN: BP liable; likely pain related. Continue home medications. Tachycardia: 2/2 pain issues. Continue to monitor. NIDDMII: Hold Metformin. Medium Dose SSI. BS has been more controlled today. Morbid Obesity     Hospitalist team will sign off at this time. Please feel free to contact us with any questions or concerns. Chief Complaint: Back Pain     Subjective (past 24 hours):   No acute events overnight. Pt states that her pain is uncontrolled. Pt is crying at the bedside. It was explained to the patient that the plan of care will be driven by spine and pain management. Pt has no issues or concerns at this time. Past medical history, family history, social history and allergies reviewed again and is unchanged since admission. ROS (All review of systems completed. Pertinent positives noted.  Otherwise All other systems reviewed and negative.)     Medications:  Reviewed    Infusion Medications    sodium chloride 100 mL/hr at 10/03/22 1033    sodium chloride       Scheduled Medications    sodium chloride flush  5-40 mL IntraVENous 2 times per day    [Held by provider] enoxaparin  40 mg SubCUTAneous BID    amLODIPine  5 mg Oral Daily    lisinopril  10 mg Oral Daily    [Held by provider] metFORMIN  500 mg Oral BID WC    metoprolol succinate  50 mg Oral Daily    pantoprazole  40 mg Oral BID AC     PRN Meds: sodium chloride flush, sodium chloride, ondansetron **OR** ondansetron, polyethylene glycol, morphine **OR** morphine, oxyCODONE-acetaminophen **OR** oxyCODONE-acetaminophen, cyclobenzaprine      Intake/Output Summary (Last 24 hours) at 10/3/2022 1153  Last data filed at 10/3/2022 3026  Gross per 24 hour   Intake 2442.07 ml   Output --   Net 2442.07 ml       Diet:  ADULT DIET; Regular; 5 carb choices (75 gm/meal)    Physical Exam:  BP (!) 148/94   Pulse 100   Temp 97.7 °F (36.5 °C) (Oral)   Resp 17   Ht 5' 9.5\" (1.765 m)   Wt (!) 343 lb 4.8 oz (155.7 kg)   SpO2 96%   BMI 49.97 kg/m²   General appearance: No apparent distress, appears stated age and cooperative. HEENT: Pupils equal, round, and reactive to light. Conjunctivae/corneas clear. Neck: Supple, with full range of motion. No jugular venous distention. Trachea midline. Respiratory:  Normal respiratory effort on RA. Clear to auscultation, bilaterally without Rales/Wheezes/Rhonchi. Cardiovascular: Regular rate and rhythm with normal S1/S2 without murmurs, rubs or gallops. Abdomen: Soft, non-tender, non-distended with normal bowel sounds. Musculoskeletal: passive and active ROM x 4 extremities. Skin: Skin color, texture, turgor normal.  No rashes or lesions. Neurologic:  Neurovascularly intact without any focal sensory/motor deficits.  Cranial nerves: II-XII intact, grossly non-focal.  Psychiatric: Alert and oriented, thought content appropriate, normal insight  Capillary Refill: Brisk,< 3 seconds   Peripheral Pulses: +2 palpable, equal bilaterally     Labs:   Recent Labs     10/02/22  1331 10/03/22  0547   WBC 9.1 8.8   HGB 13.6 13.1   HCT 39.0 38.3    242     Recent Labs     10/02/22  1331 10/03/22  0547    140   K 3.9 4.0    105   CO2 22* 22*   BUN 12 9   CREATININE 0.5 0.5   CALCIUM 9.0 8.8     Urinalysis:      Lab Results   Component Value Date/Time    NITRU NEGATIVE 10/02/2022 04:55 PM    WBCUA 0-2 10/02/2022 04:55 PM    BACTERIA NONE SEEN 10/02/2022 04:55 PM    RBCUA 0-2 10/02/2022 04:55 PM    BLOODU TRACE 10/02/2022 04:55 PM    GLUCOSEU NEGATIVE 10/02/2022 04:55 PM       Radiology:  MRI LUMBAR [address]   Final Result       1. Postoperative changes at L5-S1. Recurrent 12 mm ventral and left-sided disc extrusion at this level resulting in mild-to-moderate canal, moderate to severe left and mild-to-moderate right-sided foraminal stenosis. 2. Possible postoperative changes at L4-5. There is a 5.2 mm ventral and left-sided extradural defect resulting in mild-to-moderate canal, moderate left and mild-to-moderate right-sided foraminal stenosis. 3. 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   4. There is mild-to-moderate canal and bilateral foraminal stenosis at L3-4.   5. There is mild canal and mild-to-moderate bilateral foraminal stenosis at L2-3.   6. There is degenerative change involving the sacroiliac joints bilaterally. **This report has been created using voice recognition software. It may contain minor errors which are inherent in voice recognition technology. **      Final report electronically signed by DR Anibal Linn on 10/3/2022 8:51 AM        Electronically signed by TAYLER Herring on 10/3/2022 at 11:53 AM

## 2022-10-03 NOTE — PROGRESS NOTES
Pt admitted to  7K26 from ED and via cart/stretcher. Complaints: back pain . IV normal saline infusing into the antecubital right, condition patent and no redness at a rate of 100 mls/ hour with about 700 mls in the bag still. IV site free of s/s of infection or infiltration. Vital signs obtained. Assessment and data collection initiated. Two nurse skin assessment performed by stanley RGIGINS and delroy RN. Oriented to room. Explained patients right to have family, representative or physician notified of their admission. Patient has Declined for physician to be notified. Patient has Declined for family/representative to be notified. The patient is interested in [facility]. [facility]s meds to beds program?:  No    Policies and procedures for  explained. All questions answered with no further questions at this time. Fall prevention and safety brochure discussed with patient. Bed alarm on. Call light in reach.

## 2022-10-03 NOTE — CONSULTS
Pain Consult Note      Admitting Physician: Deb Enrique MD    Primary Care Provider: Cassy Gomez DO     Reason for Consult: for left L4-5 LESI     History of Present Illness:  Kimberley Mc is a 32 y.o. female admitted to [facility] on 10/2/2022. She has a personal medical history of diabetes, hypertension, hyperlipidemia, L4-5, L5-S1 microdiscectomy 2013. She presented to [facility] emergency department with complaints of  low back pain and left lower extremity radiculopathy, with some left foot weakness that has been occurring intermittently throughout the years. She states she does get flares once to twice a year. She states that her most recent flare started Tuesday of this past week, without any obvious trauma, accidents, falls. States she did notice some significant weakness in pushing her left foot down, did have difficulties with walking. She did go to [place] [id number], she was discharged with steroid and Percocet. Pain continued as well as weakness and she presented to [facility]. She did have an MRI of the lumbar spine completed which did show severe spinal stenosis and left recession at L4-5, L5-S1, and she was admitted under orthopedic care, Dr. Sophia Morales. Currently during this admission she had morphine 2-4 mg every 2 hours as needed, of which she has used 1 dose. She also had Percocet 5/325, 1-2 tabs every 4 hours as needed. Of which she has used 4 doses of the 2 tabs, and 1 dose of the 1 tab in the past 24 hours. The patient was seen by the pain team today, and at the time of our evaluation the patient complains of pain at a 6/10 in left low back and left lower leg. Pain is described as sharp, aching, burning, constant. She reports that she has had this pain for years, states that after her surgery in 2013 she did have improvement, but gets a flareup of 1-2 times a year.   She states this past Tuesday she had severe low back pain and left leg pain, that has not resolved. She states at home she typically uses topicals, over-the-counter's, repositions.      Past Medical History:        Diagnosis Date    Diabetes mellitus ([address]     Hypercholesteremia     Hypertension        Past Surgical History:        Procedure Laterality Date    BACK SURGERY Left 03/18/2013    Left L4-5 & L5-S1 microdisectomy    OVARIAN CYST SURGERY  01/01/2005    & CYST OFF FALLIOPIAN TUBE    TYMPANOSTOMY TUBE PLACEMENT      WISDOM TOOTH EXTRACTION Bilateral        Allergies:    No Known Allergies     Current Medications:   Current Facility-Administered Medications: hydrOXYzine pamoate (VISTARIL) capsule 50 mg, 50 mg, Oral, TID PRN  glucose chewable tablet 16 g, 4 tablet, Oral, PRN  dextrose bolus 10% 125 mL, 125 mL, IntraVENous, PRN **OR** dextrose bolus 10% 250 mL, 250 mL, IntraVENous, PRN  glucagon (rDNA) injection 1 mg, 1 mg, SubCUTAneous, PRN  dextrose 10 % infusion, , IntraVENous, Continuous PRN  insulin lispro (HUMALOG) injection vial 0-8 Units, 0-8 Units, SubCUTAneous, TID WC  insulin lispro (HUMALOG) injection vial 0-4 Units, 0-4 Units, SubCUTAneous, Nightly  0.9 % sodium chloride infusion, , IntraVENous, Continuous  sodium chloride flush 0.9 % injection 5-40 mL, 5-40 mL, IntraVENous, 2 times per day  sodium chloride flush 0.9 % injection 5-40 mL, 5-40 mL, IntraVENous, PRN  0.9 % sodium chloride infusion, , IntraVENous, PRN  ondansetron (ZOFRAN-ODT) disintegrating tablet 4 mg, 4 mg, Oral, Q8H PRN **OR** ondansetron (ZOFRAN) injection 4 mg, 4 mg, IntraVENous, Q6H PRN  polyethylene glycol (GLYCOLAX) packet 17 g, 17 g, Oral, Daily PRN  [Held by provider] enoxaparin (LOVENOX) injection 40 mg, 40 mg, SubCUTAneous, BID  morphine (PF) injection 2 mg, 2 mg, IntraVENous, Q2H PRN **OR** morphine injection 4 mg, 4 mg, IntraVENous, Q2H PRN  oxyCODONE-acetaminophen (PERCOCET) 5-325 MG per tablet 1 tablet, 1 tablet, Oral, Q4H PRN **OR** oxyCODONE-acetaminophen (PERCOCET) 5-325 MG per tablet 2 tablet, 2 tablet, Oral, Q4H PRN  cyclobenzaprine (FLEXERIL) tablet 10 mg, 10 mg, Oral, TID PRN  amLODIPine (NORVASC) tablet 5 mg, 5 mg, Oral, Daily  lisinopril (PRINIVIL;ZESTRIL) tablet 10 mg, 10 mg, Oral, Daily  [Held by provider] metFORMIN (GLUCOPHAGE) tablet 500 mg, 500 mg, Oral, BID WC  metoprolol succinate (TOPROL XL) extended release tablet 50 mg, 50 mg, Oral, Daily  pantoprazole (PROTONIX) tablet 40 mg, 40 mg, Oral, BID AC    Social History:  Social History     Socioeconomic History    Marital status:      Spouse name: lonnie alvarez    Number of children: 0    Years of education: 16    Highest education level: Not on file   Occupational History    Not on file   Tobacco Use    Smoking status: Former     Packs/day: 2.00     Years: 3.00     Pack years: 6.00     Types: Cigarettes     Quit date: 3/5/2010     Years since quittin.5    Smokeless tobacco: Never   Vaping Use    Vaping Use: Never used   Substance and Sexual Activity    Alcohol use: Yes     Comment: REALLY RARE    Drug use: No    Sexual activity: Yes     Partners: Male   Other Topics Concern    Not on file   Social History Narrative    Not on file     Social Determinants of Health     Financial Resource Strain: Not on file   Food Insecurity: Not on file   Transportation Needs: Not on file   Physical Activity: Not on file   Stress: Not on file   Social Connections: Not on file   Intimate Partner Violence: Not on file   Housing Stability: Not on file       Family History:       Problem Relation Age of Onset    Diabetes Maternal Uncle     Cancer Maternal Grandmother     Diabetes Maternal Grandmother     Heart Disease Maternal Grandmother     High Blood Pressure Maternal Grandmother     High Cholesterol Maternal Grandmother     Arthritis Maternal Grandfather     Cancer Paternal Grandmother        Review of Systems:  CONSTITUTIONAL:  negative  EYES:  negative  HEENT:  negative  RESPIRATORY:  negative  CARDIOVASCULAR:  negative  GASTROINTESTINAL: negative  GENITOURINARY:  negative  SKIN:  negative  HEMATOLOGIC/LYMPHATIC:  negative  MUSCULOSKELETAL:  positive for  myalgias, arthralgias, pain, decreased range of motion, and muscle weakness  NEUROLOGICAL:  positive for gait problems and pain  BEHAVIOR/PSYCH:  negative  System review otherwise negative      Physical Exam:  BP (!) 186/104   Pulse (!) 118   Temp 97.3 °F (36.3 °C) (Oral)   Resp 17   Ht 5' 9.5\" (1.765 m)   Wt (!) 343 lb 4.8 oz (155.7 kg)   SpO2 97%   BMI 49.97 kg/m²   awake  Orientation:   person, place, time  Mood: within normal limits  Affect: calm  General appearance: well groomed and in no acute distress    Memory:   normal,   Attention/Concentration: normal  Language:  normal    Cranial Nerves:  cranial nerves II-XII are grossly intact  ROM:  normal  Motor Exam:  Motor exam is 5 out of 5 all extremities with the exception of decreased dorsiflexion/plantar flexion of left foot  Tone:  normal  Sensory:  decreased sensation in left leg    Heart: normal rate and regular rhythm  Lungs: normal effort  Abdomen: soft, non-tender    Skin: warm and dry, no rash or erythema  Peripheral vascular: Pulses: Normal upper and lower extremity pulses;    Diagnostics:  Recent Results (from the past 24 hour(s))   Urine with Reflexed Micro    Collection Time: 10/02/22  4:55 PM   Result Value Ref Range    Glucose, Ur NEGATIVE NEGATIVE mg/dl    Bilirubin Urine NEGATIVE NEGATIVE    Ketones, Urine TRACE (A) NEGATIVE    Specific Gravity, Urine 1.012 1.002 - 1.030    Blood, Urine TRACE (A) NEGATIVE    pH, UA 6.5 5.0 - 9.0    Protein, UA TRACE (A) NEGATIVE    Urobilinogen, Urine 1.0 0.0 - 1.0 eu/dl    Nitrite, Urine NEGATIVE NEGATIVE    Leukocyte Esterase, Urine NEGATIVE NEGATIVE    Color, UA YELLOW STRAW-YELLOW    Character, Urine CLEAR CLEAR-SL CLOUD    RBC, UA 0-2 0-2/hpf /hpf    WBC, UA 0-2 0-4/hpf /hpf    Epithelial Cells, UA 0-2 3-5/hpf /hpf    Bacteria, UA NONE SEEN FEW/NONE SEEN /hpf    Casts UA NONE SEEN NONE SEEN /lpf    Crystals, UA NONE SEEN NONE SEEN    Renal Epithelial, UA NONE SEEN NONE SEEN    Yeast, UA NONE SEEN NONE SEEN    CASTS 2 NONE SEEN NONE SEEN /lpf    MISCELLANEOUS 2 NONE SEEN    POCT Glucose    Collection Time: 10/02/22  8:13 PM   Result Value Ref Range    POC Glucose 300 (H) 70 - 108 mg/dl   EKG 12 Lead    Collection Time: 10/02/22  9:59 PM   Result Value Ref Range    Ventricular Rate 117 BPM    Atrial Rate 117 BPM    P-R Interval 160 ms    QRS Duration 100 ms    Q-T Interval 350 ms    QTc Calculation (Bazett) 488 ms    P Axis 47 degrees    R Axis 92 degrees    T Axis 11 degrees   CBC with Auto Differential    Collection Time: 10/03/22  5:47 AM   Result Value Ref Range    WBC 8.8 4.8 - 10.8 thou/mm3    RBC 4.32 4.20 - 5.40 mill/mm3    Hemoglobin 13.1 12.0 - 16.0 gm/dl    Hematocrit 38.3 37.0 - 47.0 %    MCV 88.7 81.0 - 99.0 fL    MCH 30.3 26.0 - 33.0 pg    MCHC 34.2 32.2 - 35.5 gm/dl    RDW-CV 12.7 11.5 - 14.5 %    RDW-SD 41.4 35.0 - 45.0 fL    Platelets 922 311 - 737 thou/mm3    MPV 9.3 (L) 9.4 - 12.4 fL    Seg Neutrophils 72.0 %    Lymphocytes 21.9 %    Monocytes 4.4 %    Eosinophils 1.0 %    Basophils 0.1 %    Immature Granulocytes 0.6 %    Segs Absolute 6.3 1.8 - 7.7 thou/mm3    Lymphocytes Absolute 1.9 1.0 - 4.8 thou/mm3    Monocytes Absolute 0.4 0.4 - 1.3 thou/mm3    Eosinophils Absolute 0.1 0.0 - 0.4 thou/mm3    Basophils Absolute 0.0 0.0 - 0.1 thou/mm3    Immature Grans (Abs) 0.05 0.00 - 0.07 thou/mm3    nRBC 0 /100 wbc   Basic Metabolic Panel w/ Reflex to MG    Collection Time: 10/03/22  5:47 AM   Result Value Ref Range    Sodium 140 135 - 145 meq/L    Potassium reflex Magnesium 4.0 3.5 - 5.2 meq/L    Chloride 105 98 - 111 meq/L    CO2 22 (L) 23 - 33 meq/L    Glucose 169 (H) 70 - 108 mg/dL    BUN 9 7 - 22 mg/dL    Creatinine 0.5 0.4 - 1.2 mg/dL    Calcium 8.8 8.5 - 10.5 mg/dL   Anion Gap    Collection Time: 10/03/22  5:47 AM   Result Value Ref Range    Anion Gap 13.0 8.0 - 16.0 meq/L Glomerular Filtration Rate, Estimated    Collection Time: 10/03/22  5:47 AM   Result Value Ref Range    Est, Glom Filt Rate >90 ml/min/1.73m2   POCT Glucose    Collection Time: 10/03/22  6:06 AM   Result Value Ref Range    POC Glucose 174 (H) 70 - 108 mg/dl   POCT Glucose    Collection Time: 10/03/22 10:35 AM   Result Value Ref Range    POC Glucose 175 (H) 70 - 108 mg/dl     WET READ:       At L5-S1, a moderate central disc extrusion is seen measuring 1.2 cm in AP dimension. Moderate central canal narrowing. There is impingement on the descending spinal nerves. There is contact with the left S1 nerve root. Moderate left neural foraminal    narrowing. Mild right neuroforaminal narrowing. At L4-L5: There is broad base disc bulge with a left central disc protrusion. There is contact of the left L5 nerve root. Mild right neuroforaminal narrowing. Moderate left neuroforaminal narrowing. Mild ligamentum flavum hypertrophy. Mild central canal    narrowing. At L3-L4: There is ligamentum flavum hypertrophy and mild broad-based disc bulge. Mild central canal narrowing. No significant neuroforamina narrowing. At L2-L3: There is mild broad-based disc bulge, ligamenta flava hypertrophy. Mild central canal narrowing. No significant neuroforamina narrowing. Mild multilevel degenerative changes of the lumbar spine are noted. No abnormal enhancement is seen. This examination will be formally read by one of the neuro radiologists tomorrow. Please refer to that report. Alisha Francis ON 10/2/2022 4:04 PM.       **This report has been created using voice recognition software. It may contain minor errors which are inherent in voice recognition technology. **           PROCEDURE: MRI LUMBAR SPINE W WO CONTRAST       CLINICAL INFORMATION: Low back pain, difficulty moving left foot with numbness, tachycardia. Also rule out abscess.        COMPARISON: Plain radiographs dated 18th of March 2013. [facility] [place] TECHNIQUE: Sagittal and axial T1 and T2-weighted images were obtained to the lumbar spine. Postcontrast axial and sagittal T1-weighted images were also obtained. FINDINGS:           The lumbar vertebral bodies are normally aligned. There is degenerative change in the vertebral body endplates adjacent to the L2-3, L5-S1 and to lesser extent L3-1 L4-5 disc spaces. .  There is no bone marrow edema. There are no compression fractures. No pars defects are noted. There is a congenitally narrow AP diameter of the lumbar spinal canal..       The visualized aspects of the distal spinal cord are normal. The nerve roots of the cauda equina and the tip of the conus are normal.       There are no gross abnormalities in the distal thoracic spine. On the axial images, at T12-L1, there is a 1.1 mm bulging disc. This causes mild canal stenosis. There is  no compression upon underlying conus. At L1-L2, there is no disc herniation, canal or foraminal stenosis. At L2-3, there is a 1.5 mm bulging disc and facet hypertrophy. This causes mild canal and mild-to-moderate bilateral foraminal stenosis. At L3-4, there is a 1.8 mm bulging disc and facet hypertrophy. This results in mild-to-moderate canal and bilateral foraminal stenosis. At L4-5, there are possible postoperative changes. There is a 5.2 mm ventral and left-sided extradural defect. This results in mild-to-moderate canal, moderate left and mild-to-moderate right-sided foraminal stenosis. At L5-S1, the patient is status post left-sided laminotomy. There is a 12 mm ventral and left-sided extradural defect secondary to disc extrusion and facet hypertrophy. The combination of these findings result in mild-to-moderate canal, moderate to    severe left and mild to moderate right-sided foraminal stenosis. There is no abnormal enhancement.        There is degenerative change involving the sacroiliac joints bilaterally. .               Impression       1. Postoperative changes at L5-S1. Recurrent 12 mm ventral and left-sided disc extrusion at this level resulting in mild-to-moderate canal, moderate to severe left and mild-to-moderate right-sided foraminal stenosis. 2. Possible postoperative changes at L4-5. There is a 5.2 mm ventral and left-sided extradural defect resulting in mild-to-moderate canal, moderate left and mild-to-moderate right-sided foraminal stenosis. 3. 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   4. There is mild-to-moderate canal and bilateral foraminal stenosis at L3-4.   5. There is mild canal and mild-to-moderate bilateral foraminal stenosis at L2-3.   6. There is degenerative change involving the sacroiliac joints bilaterally. Impression:  Acute pain  Chronic pain syndrome  Lumbar stenosis  Lumbar radiculopathy  Lumbar spondylosis      Met with today's pain team as above. Discussed patient symptoms, reviewed medications and possible drug interactions, medication side effect profiles, previous medications and their efficacies, medical concerns regarding each pain management option (ie blood pressure, GI concerns, etc). Also reviewed labs (including creatinine clearance and LFT's regarding medication metabolism). Also reviewed all work-up studies including radiologic and other consultants. OARRS was obtained and reviewed. Recommendations:  Discussed and educated on pain management alternatives, such as; repositioning, distraction, meditation, ice, heat  Stop IV medications  Continue Perocet 5/325, 1-2 tabs, every 4 hours as needed for pain  Add bowel- senna, colace  Add Lyrica 50 mg BID to assist with lumbar radiculopathy pain. Did discuss with her that medication can be increased for better control, can take time to obtain full effect.   Stop flexeril  Add Baclofen   Add Ibuprofen 600 mg QID  Add Voltaren gel prn   Discussed with patient procedures can be completed to assist with some of her pain, such as the left transforaminal lumbar epidural steroid injection at L4-5. She has observation at this time, we would need to see her in our outpatient pain clinic due to observational status unable to do procedures in the hospital.  Pain management will continue to follow, did discuss medication adjustments can be made to assist with pain control    It was my pleasure to evaluate Marichuy Stewart today. I spent over 70 minutes evaluating this patient and completing documentation. Please call with questions.     Monet Wood, APRN - CNP

## 2022-10-03 NOTE — H&P
Orthopedic Spine H&P  Department of Orthopedic Surgery  Marlen Quiles PA-C  Attending: Dr. Selma Albarado    Chief Complaint: Acute on chronic LBP, left foot drop    HPI: Gianni Trevino is a 32 y.o. female who we admitted under our service yesterday from the ED. She is a patient who underwent a left L4-L5 and L5-S1 microdiscectomy under the care of Dr. Trini Bergman in 2013. She continued to have a LBP and LLE radiculopathy with a weak left foot intermittently throughout the years which improved after chiropractic care in the past. He most recent flare began Tuesday of last week without any obvious trauma or inciting events and reported significant weakness in dorsiflexion of her left foot which prompted her to go to [facility] in which she was discharged with pain medications. Her pain and weakness continued and she decided to come to [facility] for another opinion and was worked up with an updated MRI LS without demonstrating severe spinal stenosis centrally and left lateral recession at L4-L5 and L5-S1 levels. She denies a change of her bowel or bladder control. She describes a LBP as sharp that will radiate to left buttock, hip, and posterior thigh with associated numbness in her calf and foot. Denies any RLE symptoms. Wound like to attempt conservative treatment with an injection prior to discussion of surgery. Hospitalist was consulted for clearance for possible surgery vs injection and cleared. Assessment:    1. Acute on Chronic LBP with radiculopathy   2. Left foot drop    Plan:    1. Pain control   2. OOB with assistance. PT/OT consulted   3. DM diet   4. Consult PM for left L4-L5 SURAJ and pain medication   5. Discharge pending clinical coure    No Known Allergies  Prior to Visit Medications    Medication Sig Taking?  Authorizing Provider   omeprazole (PRILOSEC) 20 MG delayed release capsule Take 20 mg by mouth Daily Yes Historical Provider, MD   metFORMIN (GLUCOPHAGE) 500 MG tablet Take 500 mg by mouth 2 times daily (with meals) Yes Historical Provider, MD   amLODIPine (NORVASC) 5 MG tablet Take 5 mg by mouth daily Yes Historical Provider, MD   lisinopril (PRINIVIL;ZESTRIL) 10 MG tablet Take 10 mg by mouth daily Yes Historical Provider, MD   metoprolol succinate (TOPROL XL) 50 MG extended release tablet Take 50 mg by mouth daily Yes Historical Provider, MD   oxyCODONE-acetaminophen (PERCOCET) 5-325 MG per tablet Take 1 tablet by mouth every 6 hours as needed for Pain. Yes Historical Provider, MD     Past Medical History:   Diagnosis Date    Diabetes mellitus ([facility] [address]     Hypercholesteremia     Hypertension      Past Surgical History:   Procedure Laterality Date    BACK SURGERY Left 03/18/2013    Left L4-5 & L5-S1 microdisectomy    OVARIAN CYST SURGERY  01/01/2005    & CYST OFF FALLIOPIAN TUBE    TYMPANOSTOMY TUBE PLACEMENT      WISDOM TOOTH EXTRACTION Bilateral        Review of Systems   Constitutional:  Negative for chills and fever. Respiratory:  Negative for cough, chest tightness and shortness of breath. Cardiovascular:  Negative for chest pain and palpitations. Gastrointestinal:  Negative for constipation, diarrhea, nausea and vomiting. Genitourinary:  Negative for difficulty urinating and dysuria. Musculoskeletal:  Positive for back pain and gait problem. Neurological:  Positive for weakness and numbness. Psychiatric/Behavioral: Negative. Physical Exam  Constitutional:       General: She is not in acute distress. Appearance: Normal appearance. She is obese. Pulmonary:      Effort: Pulmonary effort is normal.      Breath sounds: No wheezing. Skin:     General: Skin is warm and dry. Findings: No bruising or lesion. Neurological:      Mental Status: She is alert and oriented to person, place, and time. Sensory: Sensory deficit (decreased sensation from left knee extend distally to foot compared to right) present.       Motor: Weakness (0/5 left DF, 1/5 left EHL, 3+/5 left PF) present. Imaging:   MRI LUMBAR SPINE W WO CONTRAST    Result Date: 10/2/2022  WET READ: At L5-S1, a moderate central disc extrusion is seen measuring 1.2 cm in AP dimension. Moderate central canal narrowing. There is impingement on the descending spinal nerves. There is contact with the left S1 nerve root. Moderate left neural foraminal narrowing. Mild right neuroforaminal narrowing. At L4-L5: There is broad base disc bulge with a left central disc protrusion. There is contact of the left L5 nerve root. Mild right neuroforaminal narrowing. Moderate left neuroforaminal narrowing. Mild ligamentum flavum hypertrophy. Mild central canal narrowing. At L3-L4: There is ligamentum flavum hypertrophy and mild broad-based disc bulge. Mild central canal narrowing. No significant neuroforamina narrowing. At L2-L3: There is mild broad-based disc bulge, ligamenta flava hypertrophy. Mild central canal narrowing. No significant neuroforamina narrowing. Mild multilevel degenerative changes of the lumbar spine are noted. No abnormal enhancement is seen. This examination will be formally read by one of the neuro radiologists tomorrow. Please refer to that report. Florencia Lopez ON 10/2/2022 4:04 PM. **This report has been created using voice recognition software. It may contain minor errors which are inherent in voice recognition technology. **       Electronically signed by Skylar Reyna PA-C on 10/3/22 at 6:51 AM EDT

## 2022-10-03 NOTE — PLAN OF CARE
Problem: Discharge Planning  Goal: Discharge to home or other facility with appropriate resources  10/3/2022 1111 by Marilee Weston RN  Outcome: Progressing  Flowsheets  Taken 10/3/2022 1111 by Marilee Weston RN  Discharge to home or other facility with appropriate resources:   Identify barriers to discharge with patient and caregiver   Identify discharge learning needs (meds, wound care, etc)   Problem: Pain  Goal: Verbalizes/displays adequate comfort level or baseline comfort level  10/3/2022 1111 by Marilee Weston RN  Outcome: Progressing  Flowsheets  Taken 10/3/2022 1111 by Marilee Weston RN  Verbalizes/displays adequate comfort level or baseline comfort level:   Encourage patient to monitor pain and request assistance   Administer analgesics based on type and severity of pain and evaluate response   Assess pain using appropriate pain scale   Implement non-pharmacological measures as appropriate and evaluate response  Note: Patient taking pain medication on MAR as needed to control pain. Use of non-pharmacologic pain management including ice/repositioning. Patient pain goal is 3. Problem: Safety - Adult  Goal: Free from fall injury  10/3/2022 1111 by Marilee Weston RN  Outcome: Progressing  Flowsheets  Taken 10/3/2022 1111 by Marilee Weston RN  Free From Fall Injury: Instruct family/caregiver on patient safety  Taken 10/3/2022 0036 by Shanika Khan RN  Free From Fall Injury: Instruct family/caregiver on patient safety  Note: Patient up with staff assistance. Able to use call light. Patient has remained free of falls during this shift. Appropriate fall prevention measures in place. Problem: ABCDS Injury Assessment  Goal: Absence of physical injury  10/3/2022 1111 by Marilee Weston RN  Outcome: Progressing  Flowsheets  Taken 10/3/2022 1111 by Marilee Weston RN  Absence of Physical Injury: Implement safety measures based on patient assessment    Care plan reviewed with patient.   Patient verbalized understanding of the plan of care and contribute to goal setting.

## 2022-10-03 NOTE — CARE COORDINATION
Case Management Assessment  Initial Evaluation    Date/Time of Evaluation: 10/3/2022 1:10 PM  Assessment Completed by: Renetta Mcbride RN    If patient is discharged prior to next notation, then this note serves as note for discharge by case management. Patient Name: Tony Mendieta                   YOB: 1995  Diagnosis: Intractable low back pain [M54.59]                   Date / Time: 10/2/2022  1:10 PM    Patient Admission Status: Observation     Current PCP: Drew Cao, DO  PCP verified by CM? Yes    Chart Reviewed: Yes      History Provided by: Patient  Patient Orientation: Alert and Oriented    Patient Cognition: Alert    Hospitalization in the last 30 days (Readmission):  No    If yes, Readmission Assessment in  Navigator will be completed. Advance Directives:     Code Status: Full Code       Discharge Planning  Patient lives with: Spouse/Significant Other Type of Home: Trailer/Mobile Home  Primary Care Giver: Self  Patient Support Systems include: Spouse/Significant Other   Current Financial resources:    Current community resources:    Current services prior to admission: None   Type of Home Care services:  None    ADLS  Prior functional level: Independent in ADLs/IADLs  Current functional level: Independent in ADLs/IADLs    PT AM-PAC:   /24  OT AM-PAC:   /24    Family can provide assistance at DC: Yes  Would you like Case Management to discuss the discharge plan with any other family members/significant others, and if so, who?  No  Plans to Return to Present Housing: Yes  Other Identified Issues/Barriers to RETURNING to current housing: na  Potential Assistance needed at discharge: N/A  Patient expects to discharge to: Trailer/mobile home  Plan for transportation at discharge:      Financial  Payor: Butch Burleson / Plan: [address] [place] [address] Sq / Product Type: *No Product type* /     Does insurance require precert for SNF: Yes    Potential assistance Purchasing Medications: No  Meds-to-Beds request: Yes      [id number] [facility], [address] [address]  Phone: [phone] Fax: [phone]      Factors facilitating achievement of predicted outcomes: Caregiver support, Motivated, Cooperative, and Good insight into deficits    Barriers to discharge: Decreased sensation and Lower extremity weakness    Additional Case Management Notes: To ED of low back pain that is been going on since Thursday. She has pain going to her left leg with numbness and tingling. Tordol, Valium IV, Decadron IV, ortho spine and pain management consulted. 10/2/22 MRI Lumbar spine: At L5-S1, a moderate central disc extrusion is seen measuring 1.2 cm in AP dimension. Moderate central canal narrowing. There is impingement on the descending spinal nerves. Met with  Ace Alarcon and spouse Pedro Hoover; they reside home in [facility] in [place] home. She works full time, independent pta, and declined [place] or needs at this time. Will follow. The Plan for Transition of Care is related to the following treatment goals of Intractable low back pain [M54.59]         The Patient and/or Patient Representative Agree with the Discharge Plan?       Yvette Vee RN  Case Management Department

## 2022-10-04 LAB
GLUCOSE BLD-MCNC: 137 MG/DL (ref 70–108)
GLUCOSE BLD-MCNC: 145 MG/DL (ref 70–108)
GLUCOSE BLD-MCNC: 147 MG/DL (ref 70–108)
GLUCOSE BLD-MCNC: 157 MG/DL (ref 70–108)

## 2022-10-04 PROCEDURE — G0378 HOSPITAL OBSERVATION PER HR: HCPCS

## 2022-10-04 PROCEDURE — 97535 SELF CARE MNGMENT TRAINING: CPT

## 2022-10-04 PROCEDURE — 1200000000 HC SEMI PRIVATE

## 2022-10-04 PROCEDURE — 82948 REAGENT STRIP/BLOOD GLUCOSE: CPT

## 2022-10-04 PROCEDURE — 6370000000 HC RX 637 (ALT 250 FOR IP): Performed by: NURSE PRACTITIONER

## 2022-10-04 PROCEDURE — 97163 PT EVAL HIGH COMPLEX 45 MIN: CPT

## 2022-10-04 PROCEDURE — 6370000000 HC RX 637 (ALT 250 FOR IP): Performed by: PHYSICIAN ASSISTANT

## 2022-10-04 PROCEDURE — 97166 OT EVAL MOD COMPLEX 45 MIN: CPT

## 2022-10-04 PROCEDURE — 97116 GAIT TRAINING THERAPY: CPT

## 2022-10-04 PROCEDURE — 2580000003 HC RX 258: Performed by: PHYSICIAN ASSISTANT

## 2022-10-04 PROCEDURE — 99233 SBSQ HOSP IP/OBS HIGH 50: CPT | Performed by: NURSE PRACTITIONER

## 2022-10-04 RX ADMIN — OXYCODONE AND ACETAMINOPHEN 2 TABLET: 5; 325 TABLET ORAL at 00:33

## 2022-10-04 RX ADMIN — BACLOFEN 10 MG: 10 TABLET ORAL at 13:52

## 2022-10-04 RX ADMIN — PREGABALIN 50 MG: 50 CAPSULE ORAL at 08:42

## 2022-10-04 RX ADMIN — OXYCODONE AND ACETAMINOPHEN 2 TABLET: 5; 325 TABLET ORAL at 04:15

## 2022-10-04 RX ADMIN — BACLOFEN 10 MG: 10 TABLET ORAL at 20:27

## 2022-10-04 RX ADMIN — PANTOPRAZOLE SODIUM 40 MG: 40 TABLET, DELAYED RELEASE ORAL at 16:46

## 2022-10-04 RX ADMIN — SODIUM CHLORIDE, PRESERVATIVE FREE 10 ML: 5 INJECTION INTRAVENOUS at 08:44

## 2022-10-04 RX ADMIN — OXYCODONE AND ACETAMINOPHEN 2 TABLET: 5; 325 TABLET ORAL at 08:42

## 2022-10-04 RX ADMIN — OXYCODONE AND ACETAMINOPHEN 1 TABLET: 5; 325 TABLET ORAL at 13:53

## 2022-10-04 RX ADMIN — PANTOPRAZOLE SODIUM 40 MG: 40 TABLET, DELAYED RELEASE ORAL at 04:15

## 2022-10-04 RX ADMIN — DOCUSATE SODIUM 100 MG: 100 CAPSULE, LIQUID FILLED ORAL at 08:42

## 2022-10-04 RX ADMIN — HYDROXYZINE PAMOATE 50 MG: 50 CAPSULE ORAL at 00:33

## 2022-10-04 RX ADMIN — BACLOFEN 10 MG: 10 TABLET ORAL at 08:42

## 2022-10-04 RX ADMIN — METOPROLOL SUCCINATE 50 MG: 50 TABLET, FILM COATED, EXTENDED RELEASE ORAL at 08:42

## 2022-10-04 RX ADMIN — PREGABALIN 50 MG: 50 CAPSULE ORAL at 20:27

## 2022-10-04 RX ADMIN — SODIUM CHLORIDE, PRESERVATIVE FREE 10 ML: 5 INJECTION INTRAVENOUS at 20:28

## 2022-10-04 RX ADMIN — LISINOPRIL 10 MG: 10 TABLET ORAL at 08:42

## 2022-10-04 RX ADMIN — AMLODIPINE BESYLATE 5 MG: 5 TABLET ORAL at 08:43

## 2022-10-04 RX ADMIN — OXYCODONE AND ACETAMINOPHEN 2 TABLET: 5; 325 TABLET ORAL at 20:28

## 2022-10-04 ASSESSMENT — PAIN SCALES - GENERAL
PAINLEVEL_OUTOF10: 8
PAINLEVEL_OUTOF10: 6
PAINLEVEL_OUTOF10: 7
PAINLEVEL_OUTOF10: 8
PAINLEVEL_OUTOF10: 6
PAINLEVEL_OUTOF10: 7

## 2022-10-04 ASSESSMENT — PAIN DESCRIPTION - DESCRIPTORS
DESCRIPTORS: ACHING;SHOOTING;STABBING;THROBBING
DESCRIPTORS: ACHING;STABBING
DESCRIPTORS: ACHING
DESCRIPTORS: ACHING;STABBING;SHOOTING

## 2022-10-04 ASSESSMENT — PAIN DESCRIPTION - LOCATION
LOCATION: BACK;LEG
LOCATION: BACK
LOCATION: BACK;LEG
LOCATION: BACK
LOCATION: BACK

## 2022-10-04 ASSESSMENT — PAIN SCALES - WONG BAKER: WONGBAKER_NUMERICALRESPONSE: 2

## 2022-10-04 ASSESSMENT — PAIN DESCRIPTION - ORIENTATION
ORIENTATION: LEFT
ORIENTATION: MID;LOWER;LEFT

## 2022-10-04 NOTE — PLAN OF CARE
Problem: Discharge Planning  Goal: Discharge to home or other facility with appropriate resources  10/3/2022 2203 by Ramin Díaz RN  Outcome: Progressing  Flowsheets  Taken 10/3/2022 2203  Discharge to home or other facility with appropriate resources:   Arrange for needed discharge resources and transportation as appropriate   Identify barriers to discharge with patient and caregiver   Identify discharge learning needs (meds, wound care, etc)  Taken 10/3/2022 2015  Discharge to home or other facility with appropriate resources:   Identify barriers to discharge with patient and caregiver   Arrange for needed discharge resources and transportation as appropriate  10/3/2022 1111 by Charito Cheng RN  Outcome: Progressing  Flowsheets  Taken 10/3/2022 1111 by Charito Cheng RN  Discharge to home or other facility with appropriate resources:   Identify barriers to discharge with patient and caregiver   Identify discharge learning needs (meds, wound care, etc)  Taken 10/3/2022 0036 by Griselda Hand, RN  Discharge to home or other facility with appropriate resources:   Identify barriers to discharge with patient and caregiver   Arrange for needed discharge resources and transportation as appropriate   Identify discharge learning needs (meds, wound care, etc)   Refer to discharge planning if patient needs post-hospital services based on physician order or complex needs related to functional status, cognitive ability or social support system     Problem: Pain  Goal: Verbalizes/displays adequate comfort level or baseline comfort level  10/3/2022 2203 by Ramin Díaz RN  Outcome: Progressing  Flowsheets  Taken 10/3/2022 2203  Verbalizes/displays adequate comfort level or baseline comfort level:   Encourage patient to monitor pain and request assistance   Assess pain using appropriate pain scale  Taken 10/3/2022 2015  Verbalizes/displays adequate comfort level or baseline comfort level:   Encourage patient to monitor pain and request assistance   Assess pain using appropriate pain scale  10/3/2022 1111 by Evan Shannon RN  Outcome: Progressing  Flowsheets  Taken 10/3/2022 1111 by Evan Shannon RN  Verbalizes/displays adequate comfort level or baseline comfort level:   Encourage patient to monitor pain and request assistance   Administer analgesics based on type and severity of pain and evaluate response   Assess pain using appropriate pain scale   Implement non-pharmacological measures as appropriate and evaluate response  Taken 10/3/2022 0036 by Guerita Alvarado RN  Verbalizes/displays adequate comfort level or baseline comfort level:   Encourage patient to monitor pain and request assistance   Assess pain using appropriate pain scale   Administer analgesics based on type and severity of pain and evaluate response   Implement non-pharmacological measures as appropriate and evaluate response  Note: Patient taking pain medication on MAR as needed to control pain. Use of non-pharmacologic pain management including ice/repositioning. Patient pain goal is 3. Problem: Safety - Adult  Goal: Free from fall injury  10/3/2022 2203 by Cher Ruiz RN  Outcome: Progressing  Flowsheets (Taken 10/3/2022 2203)  Free From Fall Injury:   Maranda Lundberg family/caregiver on patient safety   Based on caregiver fall risk screen, instruct family/caregiver to ask for assistance with transferring infant if caregiver noted to have fall risk factors  10/3/2022 1111 by Evan Shannon RN  Outcome: Progressing  Flowsheets  Taken 10/3/2022 1111 by Evan Shannon RN  Free From Fall Injury: Instruct family/caregiver on patient safety  Taken 10/3/2022 0036 by Guerita Alvarado RN  Free From Fall Injury: Instruct family/caregiver on patient safety  Note: Patient up with staff assistance. Able to use call light. Patient has remained free of falls during this shift. Appropriate fall prevention measures in place.        Problem: ABCDS Injury Assessment  Goal: Absence of physical injury  10/3/2022 2203 by Ivett Lan RN  Outcome: Progressing  Flowsheets (Taken 10/3/2022 2203)  Absence of Physical Injury: Implement safety measures based on patient assessment  10/3/2022 1111 by Sarika Iqbal RN  Outcome: Progressing  Flowsheets  Taken 10/3/2022 1111 by Sarika Iqbal RN  Absence of Physical Injury: Implement safety measures based on patient assessment  Taken 10/3/2022 0036 by Amara Seth RN  Absence of Physical Injury: Implement safety measures based on patient assessment  Note: Patient up with staff assistance. Able to use call light. Patient has remained free of falls during this shift. Appropriate fall prevention measures in place. Care plan reviewed with patient and family. Patient and family verbalize understanding of the plan of care and contribute to goal setting.

## 2022-10-04 NOTE — CARE COORDINATION
10/4/22, 1:54 PM EDT    DISCHARGE ON GOING EVALUATION    [facility] day: 0  Location: -26/026-A Reason for admit: Intractable low back pain [M54.59]   Procedure:   10/2 MRI lumbar spine: 1. Postoperative changes at L5-S1. Recurrent 12 mm ventral and left-sided disc extrusion at this level resulting in mild-to-moderate canal, moderate to severe left and mild-to-moderate right-sided foraminal stenosis. 2. Possible postoperative changes at L4-5. There is a 5.2 mm ventral and left-sided extradural defect resulting in mild-to-moderate canal, moderate left and mild-to-moderate right-sided foraminal stenosis. 3. 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 4. There is mild-to-moderate canal and bilateral foraminal stenosis at L3-4. 5. There is mild canal and mild-to-moderate bilateral foraminal stenosis at L2-3. 6. There is degenerative change involving the sacroiliac joints bilaterally. Barriers to Discharge: Ortho and pain management following. IVF. Baclofen. Lyrica. Percocet. ? L4-L5 SURAJ in IR. PT/OT. PCP: Sadie Isaacs DO  Readmission Risk Score: 8.9%  Patient Goals/Plan/Treatment Preferences: Plans to return home with spouse, she is independent and denied needs.

## 2022-10-04 NOTE — PROGRESS NOTES
[address] - [id number]    Time In: 3443  Time Out: 5845  Timed Code Treatment Minutes: 8 Minutes  Minutes: 14          Date: 10/4/2022  Patient Name: Sarah Pérez,  Gender:  female        MRN: [id number]  : 1995  (32 y.o.)      Referring Practitioner: TAYLER Rubin  Diagnosis: intractable low back pain  Additional Pertinent Hx: per H&P: Garett Escobar is a 32 y.o. female who we admitted under our service yesterday from the ED. She is a patient who underwent a left L4-L5 and L5-S1 microdiscectomy under the care of Dr. Suzy Handley in . She continued to have a LBP and LLE radiculopathy with a weak left foot intermittently throughout the years which improved after chiropractic care in the past. He most recent flare began Tuesday of last week without any obvious trauma or inciting events and reported significant weakness in dorsiflexion of her left foot which prompted her to go [facility] in which she was discharged with pain medications. Her pain and weakness continued and she decided to come to [facility] for another opinion and was worked up with an updated MRI LS without demonstrating severe spinal stenosis centrally and left lateral recession at L4-L5 and L5-S1 levels. She denies a change of her bowel or bladder control. She describes a LBP as sharp that will radiate to left buttock, hip, and posterior thigh with associated numbness in her calf and foot. Denies any RLE symptoms. Wound like to attempt conservative treatment with an injection prior to discussion of surgery. Hospitalist was consulted for clearance for possible surgery vs injection and cleared.      Restrictions/Precautions:  Restrictions/Precautions: Fall Risk  Position Activity Restriction  Spinal Precautions: No Bending, No Lifting, No Twisting (follow for comfort)  Other position/activity restrictions: L foot drop    Subjective:  Chart Reviewed: Yes  Patient assessed for rehabilitation services?: Yes  Subjective: pleasant and cooperative    General:        Hearing: Within functional limits       Pain: 7/10: back per pt    Vitals: Vitals not assessed per clinical judgement, see nursing flowsheet    Social/Functional History:    Lives With: Spouse  Type of Home: Mobile home  Home Layout: One level  Home Access: Stairs to enter with rails  Entrance Stairs - Number of Steps: 4 steps     Bathroom Shower/Tub: Walk-in shower  Bathroom Toilet: Standard  Bathroom Equipment: Built-in shower seat       ADL Assistance: Independent  Homemaking Assistance: Independent  Homemaking Responsibilities: Yes  Ambulation Assistance: Independent  Transfer Assistance: Independent    Active : Yes  Mode of Transportation: Car  Occupation: Full time employment  Type of Occupation:  for behavioral health  Additional Comments: Pt reports fully indep PLOF without AD. Pt reports family may have AD/AE in storage. OBJECTIVE:  Range of Motion:  Bilateral Lower Extremity: WFL, except left foot drop noted    Strength:  Bilateral Lower Extremity: >/=3/5 except left ankle DF 1/5    Balance:  Static Sitting Balance:  Modified Independent  Dynamic Sitting Balance: Modified Independent, reaching shoulder to waist level  Static Standing Balance: Supervision  Dynamic Standing Balance: Stand By Assistance, at sink with 0-1 UE support, also reaching for tray table with 1UE support at waist level, no LOB    Bed Mobility:  Rolling to Left: Modified Independent   Supine to Sit: Modified Independent  Sit to Supine: Modified Independent   Scooting: Modified Independent  HOB up, cues for log roll technique  Transfers:  Sit to Stand: Supervision  Stand to Sit:Supervision    Ambulation:  Stand By Assistance  Distance: 12'x1, 110'x1, 20'x1  Surface: Level Tile  Device: RW for first 2 bouts of amb, pt using UE support on furniture for last bout of amb  Gait Deviations:   Forward Flexed Posture, Slow Marisabel, Decreased Heel Strike on Left, Mild Path Deviations, and no LOB, dec wt shift to LLE at times      Functional Outcome Measures: Completed  AM-PAC Inpatient Mobility Raw Score : 20  AM-PAC Inpatient T-Scale Score : 47.67    ASSESSMENT:  Activity Tolerance:  Patient tolerance of  treatment: fair. Treatment Initiated: Treatment and education initiated within context of evaluation. Evaluation time included review of current medical information, gathering information related to past medical, social and functional history, completion of standardized testing, formal and informal observation of tasks, assessment of data and development of plan of care and goals. Treatment time included skilled education and facilitation of tasks to increase safety and independence with functional mobility for improved independence and quality of life. Assessment: Body Structures, Functions, Activity Limitations Requiring Skilled Therapeutic Intervention: Decreased functional mobility , Decreased strength, Decreased endurance, Decreased tolerance to work activity, Decreased balance, Increased pain  Assessment: Daisy Linares is a 32 y.o. female that presents with intractable low back pain. she is ind prior to admission now requiring assist for basic mobility. Pt demonstrates a decrease in baseline by way of bed mobility, transfers and ambulation secondary to decreased activity tolerance, strength, fatigue, and balance deficits. Pt will benefit from skilled PT services throughout admission and beyond hospital discharge for improvements in functional mobility and in order to decrease fall risk and return pt to PLOF. Therapy Prognosis: Good    Requires PT Follow-Up: Yes    Discharge Recommendations:  Discharge Recommendations: Continue to assess pending progress (pt plans home with outpt PT)    Patient Education:      .     Patient Education  Education Given To: Patient  Education Provided: Role of Therapy, Plan of Care  Education Method: Verbal  Barriers to Learning: None  Education Outcome: Verbalized understanding, Demonstrated understanding       Equipment Recommendations: Other: pt checking to see if she can borrow RW from family    Plan:  Specific Instructions for Next Treatment: therex and mobility with back precautions  General Plan:  (5X O)  Specific Instructions for Next Treatment: therex and mobility with back precautions    Goals:  Patient Goals : less pain  Short Term Goals  Time Frame for Short Term Goals: by discharge  Short Term Goal 1: bed mobility with MOD I to get in/out of bed  Short Term Goal 2: transfer with MOD I to get in/out of chairs  Short Term Goal 3: amb >100'x1 with LRAD and MOD I to walk safely in home  Short Term Goal 4: negotiate 4 steps with HR and MOD I to enter home safely  Long Term Goals  Time Frame for Long Term Goals : no LTGs set secondary to short ELOS    Following session, patient left in safe position with all fall risk precautions in place.

## 2022-10-04 NOTE — PROGRESS NOTES
Department of Orthopedic Surgery  Spine Service  Progress Note        Subjective:   10/4/22  Yeni Bello is resting in the chair and unable to find a comfortable position. I have held the ibuprofen due to patient planning an injection during her stay in the hospital. Continued severe LBP and LLE burning pain down her posterior lateral thigh/calf and foot. PM eval completed and changed some pain medications. They are unable to complete the left L4-L5 SURAJ unless patient is inpatient. If patient not able to be inpatient, we will proceed with IR for the injection. Patient does wish to try to follow OP PM in [place] [address] where she lives. I do feel the patient is a fall risk due to her left foot drop and will have PT/OT evaluate patient today. Vitals  VITALS:  BP (!) 141/91   Pulse (!) 101   Temp 97.5 °F (36.4 °C) (Oral)   Resp 17   Ht 5' 9.5\" (1.765 m)   Wt (!) 343 lb 4.8 oz (155.7 kg)   SpO2 96%   BMI 49.97 kg/m²   24HR INTAKE/OUTPUT:    Intake/Output Summary (Last 24 hours) at 10/4/2022 0724  Last data filed at 10/4/2022 0400  Gross per 24 hour   Intake 2758 ml   Output --   Net 2758 ml     URINARY CATHETER OUTPUT (Terrell):     DRAIN/TUBE OUTPUT:     VENT SETTINGS:     Additional Respiratory Assessments  Heart Rate: (!) 101  Resp: 17  SpO2: 96 %      PHYSICAL EXAM:    Orientation:  alert and oriented to person, place and time    Lower Extremity Motor :  Quadriceps:  5/5  Extensor hallucis longus:  1/5L & 5/5R  Dorsiflexion:  0/5L & 5/5R  Plantarflexion:  3+/5L & 5/5 R  Lower Extremity Sensory:  Abnormal:  decreased sensation left calf into foot    ABNORMAL EXAM FINDINGS:  none    LABS:  Recent Labs     10/03/22  0547   HGB 13.1   HCT 38.3       ASSESSMENT AND PLAN:    Intractable LBP with LLE radiculopathy 2/2 spinal stenosis L4-S1  Left foot drop    1:  Monitor labs   2:  Activity Level:  OOB with assistance and therapy;  PT/OT today.   3:  Pain Control:  uncontrolled, PM following appreciate recs  4:  Discharge Planning:  Pending clinical course.  Potential discharge after left L4-L5 SURAJ by IR or PM    Electronically signed by Cata Lawler PA-C on 10/4/2022 at 7:20 AM

## 2022-10-04 NOTE — PROGRESS NOTES
Anton Whitley 58  INPATIENT OCCUPATIONAL THERAPY  [facility] ORTHOPEDICS 7K  EVALUATION    Time:    Time In: 8278  Time Out: 3967  Timed Code Treatment Minutes: 8 Minutes  Minutes: 18          Date: 10/4/2022  Patient Name: Leonor Khoury,   Gender: female      MRN: [id number]  : 1995  (32 y.o.)  Referring Practitioner: Kenneth Valladares PA-C  Diagnosis: intractable low back pain  Additional Pertinent Hx: Per ER note on 10/2/2022:27 y.o. female who presents complains of low back pain that is been going on since Thursday. She has pain going to her left leg with numbness and tingling. She says that she is having difficulty moving her left foot. She has no bowel or bladder incontinence. She was seen at [address] [place] [address] emergency department today and was told if he went to see Dr. Randol Closs and her her best bet would be to be to come to the [place] [address] emergency department. She was discharged home with a Medrol Dosepak and Percocet it was here because of persistent pain. They came in to see if they can get established with Dr. Randol Closs faster. Per MRI on 10/2/2022:Postoperative changes at L5-S1. Recurrent 12 mm ventral and left-sided disc extrusion at this level resulting in mild-to-moderate canal, moderate to severe left and mild-to-moderate right-sided foraminal stenosis. 2. Possible postoperative changes at L4-5. There is a 5.2 mm ventral and left-sided extradural defect resulting in mild-to-moderate canal, moderate left and mild-to-moderate right-sided foraminal stenosis.     Restrictions/Precautions:  Restrictions/Precautions: Fall Risk  Position Activity Restriction  Spinal Precautions: No Bending, No Lifting, No Twisting (follow for comfort)  Other position/activity restrictions: L foot drop    Subjective  Chart Reviewed: Yes, Orders, Progress Notes, History and Physical  Patient assessed for rehabilitation services?: Yes  Family / Caregiver Present: No    Subjective: cooperative, sitting on EOB upon arrival mobility indep over PLOF s/p admission with pain & L foot drop. Continued OT recommended to faciliate & educate on adaptive strategies & safety with returning to ADLs at home. Performance deficits / Impairments: Decreased functional mobility , Decreased ADL status, Decreased safe awareness, Decreased endurance  Prognosis: Fair  REQUIRES OT FOLLOW-UP: Yes  Decision Making: Medium Complexity    Treatment Initiated: Treatment and education initiated within context of evaluation. Evaluation time included review of current medical information, gathering information related to past medical, social and functional history, completion of standardized testing, formal and informal observation of tasks, assessment of data and development of plan of care and goals. Treatment time included skilled education and facilitation of tasks to increase safety and independence with ADL's for improved functional independence and quality of life. Discharge Recommendations:  Home with assist PRN    Patient Education:     Patient Education  Education Given To: Patient  Education Provided: Role of Therapy, Plan of Care, Transfer Training, ADL Adaptive Strategies  Education Method: Verbal, Demonstration  Barriers to Learning: None  Education Outcome: Continued education needed    Equipment Recommendations: Other: Pt may benefit from [facility]    Plan:  Times Per Week: 6x  Current Treatment Recommendations: Balance training, Safety education & training, Functional mobility training, Endurance training, Self-Care / ADL, Equipment evaluation, education, & procurement, Patient/Caregiver education & training. See long-term goal time frame for expected duration of plan of care. If no long-term goals established, a short length of stay is anticipated.     Goals:  Patient goals : get some relief from the pain  Short Term Goals  Time Frame for Short Term Goals: until discharge  Short Term Goal 1: Pt will complete LE dressing with S & 0-1 vcs for good body mechanics  Short Term Goal 2: Pt will complete various t/fs (including toilet) & bed mobility with S & 0-2 vcs for safety & back protection strategies  Short Term Goal 3: Pt will complete mobility to/from bathroom + with ADL items retrieval with S & 0-2 vcs for walker safety  Long Term Goals  Time Frame for Long Term Goals : No LTG set d/t short ELOS         Following session, patient left in safe position with all fall risk precautions in place.

## 2022-10-04 NOTE — PROGRESS NOTES
Pain Management   Progress Note      10/4/2022 1:11 PM     Chief Complaint: Low back pain, left lower extremity pain    SUBJECTIVE: Patient is sitting up in her bed, spouse at bedside. She does appear more comfortable today, able to sit for longer period of time. She reports that she does feel like pain is little more controlled. She states she feels that the Lyrica makes her slightly groggy, but it is taking edge off her pain. She reports that Percocet is also helping take the edge off her pain. She states that she continues with the burning, sharpness in her left lower extremity, is worsened while walking. She reports she does still have some weakness feels like she cannot lift her left foot up. She states her low back continues to be an ache. Pain is rated a 5/10 currently. At best this come down to 4/10 and at worst has been up to an 8/10. In the past 24 hours she has used 5 doses of the Percocet 5/325, 2 tablets as well as Percocet 5/325 1 tablet. She denies any abdominal pain, shortness of breath, chest pain, and does report she had a bowel movement this morning.   Medications effective: Yes      Current Facility-Administered Medications   Medication Dose Route Frequency Provider Last Rate Last Admin    hydrOXYzine pamoate (VISTARIL) capsule 50 mg  50 mg Oral TID PRN Saddie Sandhoff, PA   50 mg at 10/04/22 0033    glucose chewable tablet 16 g  4 tablet Oral PRN TAYLER Jolley        dextrose bolus 10% 125 mL  125 mL IntraVENous PRN TAYLER Jolley        Or    dextrose bolus 10% 250 mL  250 mL IntraVENous PRN TAYLER Jolley        glucagon (rDNA) injection 1 mg  1 mg SubCUTAneous PRN TAYLER Jolley        dextrose 10 % infusion   IntraVENous Continuous PRN TAYLER Jolley        insulin lispro (HUMALOG) injection vial 0-8 Units  0-8 Units SubCUTAneous TID  TAYLER Jolley        insulin lispro (HUMALOG) injection vial 0-4 Units  0-4 Units SubCUTAneous Nightly Angelica TAYLER Ohara        pregabalin (LYRICA) capsule 50 mg  50 mg Oral BID Issac Gallegos APRN - CNP   50 mg at 10/04/22 0842    baclofen (LIORESAL) tablet 10 mg  10 mg Oral TID Issac Gallegos, APRN - CNP   10 mg at 10/04/22 5015    diclofenac sodium (VOLTAREN) 1 % gel 4 g  4 g Topical Q4H PRN Issac Gallegos, APRN - CNP   4 g at 10/03/22 1458    hydrALAZINE (APRESOLINE) injection 10 mg  10 mg IntraVENous Q6H PRN TAYLER Jolley   10 mg at 10/03/22 1458    docusate sodium (COLACE) capsule 100 mg  100 mg Oral BID Issac Gallegos, APRN - CNP   100 mg at 10/04/22 0842    0.9 % sodium chloride infusion   IntraVENous Continuous Dany Roach PA-C   Stopped at 10/03/22 1745    sodium chloride flush 0.9 % injection 5-40 mL  5-40 mL IntraVENous 2 times per day Dany Roach PA-C   10 mL at 10/04/22 0844    sodium chloride flush 0.9 % injection 5-40 mL  5-40 mL IntraVENous PRN Dany Roach PA-C        0.9 % sodium chloride infusion   IntraVENous PRN Dany Roach PA-C        ondansetron (ZOFRAN-ODT) disintegrating tablet 4 mg  4 mg Oral Q8H PRN Dany Roach PA-C        Or    ondansetron [facility]) injection 4 mg  4 mg IntraVENous Q6H PRN Dany Roach PA-C        polyethylene glycol (GLYCOLAX) packet 17 g  17 g Oral Daily PRN Dany Roach PA-C        [Held by provider] enoxaparin (LOVENOX) injection 40 mg  40 mg SubCUTAneous BID Dany Roach PA-C   40 mg at 10/02/22 2020    oxyCODONE-acetaminophen (PERCOCET) 5-325 MG per tablet 1 tablet  1 tablet Oral Q4H PRN Dany Roach PA-C   1 tablet at 10/03/22 0747    Or    oxyCODONE-acetaminophen (PERCOCET) 5-325 MG per tablet 2 tablet  2 tablet Oral Q4H PRN Dany Roach PA-C   2 tablet at 10/04/22 0842    amLODIPine (NORVASC) tablet 5 mg  5 mg Oral Daily Dany Roach PA-C   5 mg at 10/04/22 0843    lisinopril (PRINIVIL;ZESTRIL) tablet 10 mg  10 mg Oral Daily Dany Roach PA-C   10 mg at 10/04/22 5751    [Held by provider] metFORMIN (GLUCOPHAGE) tablet 500 mg  500 mg Oral BID  Damien Will PA-C        metoprolol succinate (TOPROL XL) extended release tablet 50 mg  50 mg Oral Daily Damien Will PA-C   50 mg at 10/04/22 1446    pantoprazole (PROTONIX) tablet 40 mg  40 mg Oral BID AC Damien Will PA-C   40 mg at 10/04/22 0415         REVIEW OF SYSTEMS:  CONSTITUTIONAL:  negative  EYES:  negative  HEENT:  negative  RESPIRATORY:  negative  CARDIOVASCULAR:  negative  GASTROINTESTINAL:  negative  GENITOURINARY:  negative  SKIN:  negative  HEMATOLOGIC/LYMPHATIC:  negative  MUSCULOSKELETAL:  positive for  myalgias, arthralgias, pain, decreased range of motion, and muscle weakness  NEUROLOGICAL:  positive for gait problems and pain  BEHAVIOR/PSYCH:  negative  System review otherwise negative    PHYSICAL EXAM:  BP (!) 151/84   Pulse (!) 110   Temp 97.6 °F (36.4 °C) (Oral)   Resp 18   Ht 5' 9.5\" (1.765 m)   Wt (!) 343 lb 4.8 oz (155.7 kg)   SpO2 98%   BMI 49.97 kg/m²  I Body mass index is 49.97 kg/m².  I   Wt Readings from Last 1 Encounters:   10/02/22 (!) 343 lb 4.8 oz (155.7 kg)    awake  Orientation:   person, place, time  Mood: within normal limits  Affect: calm  General appearance: well groomed and in no acute distress     Memory:   normal,   Attention/Concentration: normal  Language:  normal     Cranial Nerves:  cranial nerves II-XII are grossly intact  ROM:  normal  Motor Exam:  Motor exam is 5 out of 5 all extremities with the exception of decreased dorsiflexion/plantar flexion of left foot  Tone:  normal  Sensory:  decreased sensation in left leg     Heart: normal rate and regular rhythm  Lungs: normal effort  Abdomen: soft, non-tender     Skin: warm and dry, no rash or erythema  Peripheral vascular: Pulses: Normal upper and lower extremity pulses;       DATA    Recent Labs     10/02/22  1331 10/03/22  0547   WBC 9.1 8.8   RBC 4.47 4.32   HGB 13.6 13.1   HCT 39.0 38.3   MCV 87.2 88.7   MCH 30.4 30.3   MCHC 34.9 34.2    242   MPV 9.4 9.3* Recent Labs     10/02/22  1331 10/03/22  0547    140   K 3.9 4.0    105   CO2 22* 22*   BUN 12 9   CREATININE 0.5 0.5   GLUCOSE 171* 169*   CALCIUM 9.0 8.8     Recent Labs     10/03/22  1940 10/04/22  0617 10/04/22  1058   POCGLU 192* 147* 145*     Diagnostics:    WET READ:       At L5-S1, a moderate central disc extrusion is seen measuring 1.2 cm in AP dimension. Moderate central canal narrowing. There is impingement on the descending spinal nerves. There is contact with the left S1 nerve root. Moderate left neural foraminal    narrowing. Mild right neuroforaminal narrowing. At L4-L5: There is broad base disc bulge with a left central disc protrusion. There is contact of the left L5 nerve root. Mild right neuroforaminal narrowing. Moderate left neuroforaminal narrowing. Mild ligamentum flavum hypertrophy. Mild central canal    narrowing. At L3-L4: There is ligamentum flavum hypertrophy and mild broad-based disc bulge. Mild central canal narrowing. No significant neuroforamina narrowing. At L2-L3: There is mild broad-based disc bulge, ligamenta flava hypertrophy. Mild central canal narrowing. No significant neuroforamina narrowing. Mild multilevel degenerative changes of the lumbar spine are noted. No abnormal enhancement is seen. This examination will be formally read by one of the neuro radiologists tomorrow. Please refer to that report. Piotr Quintana Right ON 10/2/2022 4:04 PM.       **This report has been created using voice recognition software. It may contain minor errors which are inherent in voice recognition technology. **           PROCEDURE: MRI LUMBAR SPINE W WO CONTRAST       CLINICAL INFORMATION: Low back pain, difficulty moving left foot with numbness, tachycardia. Also rule out abscess. COMPARISON: Plain radiographs dated 18th of March 2013. Shy Doherty        TECHNIQUE: Sagittal and axial T1 and T2-weighted L5-S1. Recurrent 12 mm ventral and left-sided disc extrusion at this level resulting in mild-to-moderate canal, moderate to severe left and mild-to-moderate right-sided foraminal stenosis. 2. Possible postoperative changes at L4-5. There is a 5.2 mm ventral and left-sided extradural defect resulting in mild-to-moderate canal, moderate left and mild-to-moderate right-sided foraminal stenosis. 3. 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   4. There is mild-to-moderate canal and bilateral foraminal stenosis at L3-4.   5. There is mild canal and mild-to-moderate bilateral foraminal stenosis at L2-3.   6. There is degenerative change involving the sacroiliac joints bilaterally. ASSESSMENT  Acute pain  Chronic pain syndrome  Lumbar stenosis  Lumbar radiculopathy  Lumbar spondylosis    PLAN  Discussed and educated on pain management alternatives, such as; repositioning, distraction, meditation, ice, heat  Continue Perocet 5/325, 1-2 tabs, every 4 hours as needed for pain. 1 tablet for moderate pain (4-6/10), 2 tablets for severe pain but the 7-10/10). Continue bowel regimen- senna, colace  Continue Lyrica 50 mg BID to assist with lumbar radiculopathy pain. Did discuss with her that medication can be increased for better control, can take time to obtain full effect. Continue Baclofen   Stop Ibuprofen 600 mg QID  Continue Voltaren gel prn   Discussed with patient procedures can be completed to assist with some of her pain, such as the left transforaminal lumbar epidural steroid injection at L4-5. [facility] and we are able to set up TFLESI at main OR 10/06 at 1200. Did discuss with her and spouse about risks with procedure including infection, reaction to medication, increased pain, or bleeding, failure of procedure. She and spouse are both agreeable to continue and plan for procedure as discussed above.   She needs to continue to hold any blood thinners, Lovenox as per orthopedics as already instructed. Clearance is already noted per internal medicine, and she will be NPO on 10/06 at 0000. Pain management will continue to follow, did discuss medication adjustments can be made to assist with pain control.      Spent 35 minutes evaluating and examining patient and completing documentation      JAG Rodriguez - CNP, 10/4/2022, 1:11 PM

## 2022-10-05 LAB
GLUCOSE BLD-MCNC: 127 MG/DL (ref 70–108)
GLUCOSE BLD-MCNC: 130 MG/DL (ref 70–108)
GLUCOSE BLD-MCNC: 132 MG/DL (ref 70–108)
GLUCOSE BLD-MCNC: 156 MG/DL (ref 70–108)

## 2022-10-05 PROCEDURE — 2580000003 HC RX 258: Performed by: PHYSICIAN ASSISTANT

## 2022-10-05 PROCEDURE — 82948 REAGENT STRIP/BLOOD GLUCOSE: CPT

## 2022-10-05 PROCEDURE — 6370000000 HC RX 637 (ALT 250 FOR IP): Performed by: NURSE PRACTITIONER

## 2022-10-05 PROCEDURE — 6370000000 HC RX 637 (ALT 250 FOR IP): Performed by: PHYSICIAN ASSISTANT

## 2022-10-05 PROCEDURE — 97530 THERAPEUTIC ACTIVITIES: CPT

## 2022-10-05 PROCEDURE — 97116 GAIT TRAINING THERAPY: CPT

## 2022-10-05 PROCEDURE — 97535 SELF CARE MNGMENT TRAINING: CPT

## 2022-10-05 PROCEDURE — 99233 SBSQ HOSP IP/OBS HIGH 50: CPT | Performed by: NURSE PRACTITIONER

## 2022-10-05 PROCEDURE — 1200000000 HC SEMI PRIVATE

## 2022-10-05 RX ADMIN — OXYCODONE AND ACETAMINOPHEN 1 TABLET: 5; 325 TABLET ORAL at 21:36

## 2022-10-05 RX ADMIN — SODIUM CHLORIDE, PRESERVATIVE FREE 10 ML: 5 INJECTION INTRAVENOUS at 21:38

## 2022-10-05 RX ADMIN — METOPROLOL SUCCINATE 50 MG: 50 TABLET, FILM COATED, EXTENDED RELEASE ORAL at 08:43

## 2022-10-05 RX ADMIN — OXYCODONE AND ACETAMINOPHEN 1 TABLET: 5; 325 TABLET ORAL at 15:44

## 2022-10-05 RX ADMIN — BACLOFEN 10 MG: 10 TABLET ORAL at 21:36

## 2022-10-05 RX ADMIN — PREGABALIN 50 MG: 50 CAPSULE ORAL at 21:38

## 2022-10-05 RX ADMIN — PANTOPRAZOLE SODIUM 40 MG: 40 TABLET, DELAYED RELEASE ORAL at 17:05

## 2022-10-05 RX ADMIN — OXYCODONE AND ACETAMINOPHEN 2 TABLET: 5; 325 TABLET ORAL at 03:54

## 2022-10-05 RX ADMIN — PREGABALIN 50 MG: 50 CAPSULE ORAL at 08:43

## 2022-10-05 RX ADMIN — DOCUSATE SODIUM 100 MG: 100 CAPSULE, LIQUID FILLED ORAL at 21:36

## 2022-10-05 RX ADMIN — PANTOPRAZOLE SODIUM 40 MG: 40 TABLET, DELAYED RELEASE ORAL at 08:43

## 2022-10-05 RX ADMIN — SODIUM CHLORIDE, PRESERVATIVE FREE 10 ML: 5 INJECTION INTRAVENOUS at 09:22

## 2022-10-05 RX ADMIN — OXYCODONE AND ACETAMINOPHEN 1 TABLET: 5; 325 TABLET ORAL at 08:46

## 2022-10-05 RX ADMIN — BACLOFEN 10 MG: 10 TABLET ORAL at 14:53

## 2022-10-05 RX ADMIN — LISINOPRIL 10 MG: 10 TABLET ORAL at 08:43

## 2022-10-05 RX ADMIN — AMLODIPINE BESYLATE 5 MG: 5 TABLET ORAL at 08:43

## 2022-10-05 RX ADMIN — BACLOFEN 10 MG: 10 TABLET ORAL at 08:43

## 2022-10-05 ASSESSMENT — PAIN DESCRIPTION - DESCRIPTORS
DESCRIPTORS: THROBBING
DESCRIPTORS: THROBBING;ACHING

## 2022-10-05 ASSESSMENT — PAIN DESCRIPTION - ORIENTATION
ORIENTATION: POSTERIOR
ORIENTATION: POSTERIOR

## 2022-10-05 ASSESSMENT — PAIN SCALES - GENERAL
PAINLEVEL_OUTOF10: 5
PAINLEVEL_OUTOF10: 4
PAINLEVEL_OUTOF10: 7

## 2022-10-05 ASSESSMENT — PAIN DESCRIPTION - LOCATION
LOCATION: LEG
LOCATION: BACK
LOCATION: BACK

## 2022-10-05 NOTE — PROGRESS NOTES
Pain Management   Progress Note      10/5/2022 11:32 AM     Chief Complaint: Low back pain, left lower extremity pain    SUBJECTIVE: Patient is resting her recliner, appears very comfortable at this time. She does state that she is feeling much improved from what she was on Sunday and Monday of this week. She states she feels like the Lyrica is starting to take effect, feels like the burning sensation is much improved. She states she still has numbness in the toes and weakness in her foot with walking. But she did walk with therapy and they are talking about therapy once discharged as well as a possible boot. She states at worst pain has been a 7.5/10, and comes down to a 4/10 at best, with meds and rest.  In the past 24 hours she has used 2 doses of the Percocet 5, and 2 doses of the Percocet 10. She states she feels like this does help take the edge off the pain as well. She denies any constipation, states she did have a bowel movement today.   Medications effective: yes      Current Facility-Administered Medications   Medication Dose Route Frequency Provider Last Rate Last Admin    hydrOXYzine pamoate (VISTARIL) capsule 50 mg  50 mg Oral TID PRN TAYLER Torres   50 mg at 10/04/22 0033    glucose chewable tablet 16 g  4 tablet Oral PRN TAYLER Jolley        dextrose bolus 10% 125 mL  125 mL IntraVENous PRN TAYLER Jolley        Or    dextrose bolus 10% 250 mL  250 mL IntraVENous PRN TAYLER Jolley        glucagon (rDNA) injection 1 mg  1 mg SubCUTAneous PRN TAYLER Jolley        dextrose 10 % infusion   IntraVENous Continuous PRN TAYLER Jolley        insulin lispro (HUMALOG) injection vial 0-8 Units  0-8 Units SubCUTAneous TID WC TAYLER Jolley        insulin lispro (HUMALOG) injection vial 0-4 Units  0-4 Units SubCUTAneous Nightly TAYLER Jolley        pregabalin (LYRICA) capsule 50 mg  50 mg Oral BID JAG Saba - CNP   50 mg at 10/05/22 0830    baclofen (LIORESAL) tablet 10 mg  10 mg Oral TID JAG Malcolm CNP   10 mg at 10/05/22 0843    diclofenac sodium (VOLTAREN) 1 % gel 4 g  4 g Topical Q4H PRN JAG Malcolm CNP   4 g at 10/03/22 1458    hydrALAZINE (APRESOLINE) injection 10 mg  10 mg IntraVENous Q6H PRN TAYLER Jolley   10 mg at 10/03/22 1458    docusate sodium (COLACE) capsule 100 mg  100 mg Oral BID JAG Malcolm CNP   100 mg at 10/04/22 0842    0.9 % sodium chloride infusion   IntraVENous Continuous Shima Mascorro PA-C   Stopped at 10/03/22 1745    sodium chloride flush 0.9 % injection 5-40 mL  5-40 mL IntraVENous 2 times per day ROMAN RiveraC   10 mL at 10/05/22 9315    sodium chloride flush 0.9 % injection 5-40 mL  5-40 mL IntraVENous PRN ROMAN RiveraC        0.9 % sodium chloride infusion   IntraVENous PRN Shima Mascorro PA-C        ondansetron (ZOFRAN-ODT) disintegrating tablet 4 mg  4 mg Oral Q8H PRN Shima Mascorro PA-C        Or    ondansetron [facility]) injection 4 mg  4 mg IntraVENous Q6H PRN Shima Mascorro PA-C        polyethylene glycol (GLYCOLAX) packet 17 g  17 g Oral Daily PRN Shima Mascorro PA-C        [Held by provider] enoxaparin (LOVENOX) injection 40 mg  40 mg SubCUTAneous BID ROMAN RiveraC   40 mg at 10/02/22 2020    oxyCODONE-acetaminophen (PERCOCET) 5-325 MG per tablet 1 tablet  1 tablet Oral Q4H PRN ROMAN RiveraC   1 tablet at 10/05/22 0846    Or    oxyCODONE-acetaminophen (PERCOCET) 5-325 MG per tablet 2 tablet  2 tablet Oral Q4H PRN ROMAN RiveraC   2 tablet at 10/05/22 0354    amLODIPine (NORVASC) tablet 5 mg  5 mg Oral Daily ROMAN RiveraC   5 mg at 10/05/22 0843    lisinopril (PRINIVIL;ZESTRIL) tablet 10 mg  10 mg Oral Daily Shima Mascorro PA-C   10 mg at 10/05/22 0843    [Held by provider] metFORMIN (GLUCOPHAGE) tablet 500 mg  500 mg Oral BID  Shima Mascorro PA-C        metoprolol succinate (TOPROL XL) extended release tablet 50 mg  50 mg Oral Daily Damien Will PA-C   50 mg at 10/05/22 0843    pantoprazole (PROTONIX) tablet 40 mg  40 mg Oral BID AC Damien Will PA-C   40 mg at 10/05/22 0658         REVIEW OF SYSTEMS:  CONSTITUTIONAL:  negative  EYES:  negative  HEENT:  negative  RESPIRATORY:  negative  CARDIOVASCULAR:  negative  GASTROINTESTINAL:  negative  GENITOURINARY:  negative  SKIN:  negative  HEMATOLOGIC/LYMPHATIC:  negative  MUSCULOSKELETAL:  positive for  myalgias, arthralgias, pain, decreased range of motion, and muscle weakness  NEUROLOGICAL:  positive for gait problems and pain  BEHAVIOR/PSYCH:  negative  System review otherwise negative    PHYSICAL EXAM:  BP (!) 146/90   Pulse 100   Temp 97.9 °F (36.6 °C) (Oral)   Resp 16   Ht 5' 9.5\" (1.765 m)   Wt (!) 343 lb 4.8 oz (155.7 kg)   SpO2 97%   BMI 49.97 kg/m²  I Body mass index is 49.97 kg/m².  I   Wt Readings from Last 1 Encounters:   10/02/22 (!) 343 lb 4.8 oz (155.7 kg)    awake  Orientation:   person, place, time  Mood: within normal limits  Affect: calm  General appearance: well groomed and in no acute distress     Memory:   normal,   Attention/Concentration: normal  Language:  normal     Cranial Nerves:  cranial nerves II-XII are grossly intact  ROM:  normal  Motor Exam:  Motor exam is 5 out of 5 all extremities with the exception of decreased dorsiflexion/plantar flexion of left foot  Tone:  normal  Sensory:  decreased sensation in left leg     Heart: normal rate and regular rhythm  Lungs: normal effort  Abdomen: soft, non-tender     Skin: warm and dry, no rash or erythema  Peripheral vascular: Pulses: Normal upper and lower extremity pulses;       DATA    Recent Labs     10/02/22  1331 10/03/22  0547   WBC 9.1 8.8   RBC 4.47 4.32   HGB 13.6 13.1   HCT 39.0 38.3   MCV 87.2 88.7   MCH 30.4 30.3   MCHC 34.9 34.2    242   MPV 9.4 9.3*     Recent Labs     10/02/22  1331 10/03/22  0547    140   K 3.9 4.0    105   CO2 22* 22*   BUN 12 9   CREATININE 0.5 0.5   GLUCOSE 171* 169*   CALCIUM 9.0 8.8     Recent Labs     10/04/22  1938 10/05/22  0646 10/05/22  1041   POCGLU 157* 130* 132*     Diagnostics:    WET READ:       At L5-S1, a moderate central disc extrusion is seen measuring 1.2 cm in AP dimension. Moderate central canal narrowing. There is impingement on the descending spinal nerves. There is contact with the left S1 nerve root. Moderate left neural foraminal    narrowing. Mild right neuroforaminal narrowing. At L4-L5: There is broad base disc bulge with a left central disc protrusion. There is contact of the left L5 nerve root. Mild right neuroforaminal narrowing. Moderate left neuroforaminal narrowing. Mild ligamentum flavum hypertrophy. Mild central canal    narrowing. At L3-L4: There is ligamentum flavum hypertrophy and mild broad-based disc bulge. Mild central canal narrowing. No significant neuroforamina narrowing. At L2-L3: There is mild broad-based disc bulge, ligamenta flava hypertrophy. Mild central canal narrowing. No significant neuroforamina narrowing. Mild multilevel degenerative changes of the lumbar spine are noted. No abnormal enhancement is seen. This examination will be formally read by one of the neuro radiologists tomorrow. Please refer to that report. Dionte Jennings ON 10/2/2022 4:04 PM.       **This report has been created using voice recognition software. It may contain minor errors which are inherent in voice recognition technology. **           PROCEDURE: MRI LUMBAR SPINE W WO CONTRAST       CLINICAL INFORMATION: Low back pain, difficulty moving left foot with numbness, tachycardia. Also rule out abscess. COMPARISON: Plain radiographs dated 18th of March 2013. Annika García TECHNIQUE: Sagittal and axial T1 and T2-weighted images were obtained to the lumbar spine. Postcontrast axial and sagittal T1-weighted images were also obtained.        FINDINGS:           The lumbar vertebral bodies are normally aligned. There is degenerative change in the vertebral body endplates adjacent to the L2-3, L5-S1 and to lesser extent L3-1 L4-5 disc spaces. .  There is no bone marrow edema. There are no compression fractures. No pars defects are noted. There is a congenitally narrow AP diameter of the lumbar spinal canal..       The visualized aspects of the distal spinal cord are normal. The nerve roots of the cauda equina and the tip of the conus are normal.       There are no gross abnormalities in the distal thoracic spine. On the axial images, at T12-L1, there is a 1.1 mm bulging disc. This causes mild canal stenosis. There is  no compression upon underlying conus. At L1-L2, there is no disc herniation, canal or foraminal stenosis. At L2-3, there is a 1.5 mm bulging disc and facet hypertrophy. This causes mild canal and mild-to-moderate bilateral foraminal stenosis. At L3-4, there is a 1.8 mm bulging disc and facet hypertrophy. This results in mild-to-moderate canal and bilateral foraminal stenosis. At L4-5, there are possible postoperative changes. There is a 5.2 mm ventral and left-sided extradural defect. This results in mild-to-moderate canal, moderate left and mild-to-moderate right-sided foraminal stenosis. At L5-S1, the patient is status post left-sided laminotomy. There is a 12 mm ventral and left-sided extradural defect secondary to disc extrusion and facet hypertrophy. The combination of these findings result in mild-to-moderate canal, moderate to    severe left and mild to moderate right-sided foraminal stenosis. There is no abnormal enhancement. There is degenerative change involving the sacroiliac joints bilaterally. .               Impression       1. Postoperative changes at L5-S1.  Recurrent 12 mm ventral and left-sided disc extrusion at this level resulting in mild-to-moderate canal, moderate to severe left and mild-to-moderate right-sided foraminal stenosis. 2. Possible postoperative changes at L4-5. There is a 5.2 mm ventral and left-sided extradural defect resulting in mild-to-moderate canal, moderate left and mild-to-moderate right-sided foraminal stenosis. 3. 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   4. There is mild-to-moderate canal and bilateral foraminal stenosis at L3-4.   5. There is mild canal and mild-to-moderate bilateral foraminal stenosis at L2-3.   6. There is degenerative change involving the sacroiliac joints bilaterally. ASSESSMENT  Acute pain  Chronic pain syndrome  Lumbar stenosis  Lumbar radiculopathy  Lumbar spondylosis    PLAN  Discussed and educated on pain management alternatives, such as; repositioning, distraction, meditation, ice, heat  Continue Perocet 5/325, 1-2 tabs, every 4 hours as needed for pain. 1 tablet for moderate pain (4-6/10), 2 tablets for severe pain but the 7-10/10). Continue bowel regimen- senna, colace  Continue Lyrica 50 mg BID to assist with lumbar radiculopathy pain. Continue Baclofen   Continue Voltaren gel prn   Continue working with therapies  Plan for TFLESI at main OR 10/06 at 1200. She needs to continue to hold any blood thinners, Lovenox as per orthopedics as already instructed. Clearance is already noted per internal medicine, and she will be NPO at midnight   Pain management will be unavailable again until Monday. Darrius Carl for discharge once stable after procedure completed.  She wishes to find pain management closer to her home, if not we are willing to see her in our outpatient clinic if needed    Spent 35 minutes evaluating and examining patient and completing documentation      JAG Penaloza - CNP, 10/5/2022, 11:32 AM

## 2022-10-05 NOTE — PROGRESS NOTES
[facility] [facility]  Occupational Therapy  Daily Note  Time:   Time In: 1302  Time Out: 1312  Timed Code Treatment Minutes: 10 Minutes  Minutes: 10          Date: 10/5/2022  Patient Name: Elza Arita,   Gender: female      Room: -26/026-A  MRN: [id number]  : 1995  (32 y.o.)  Referring Practitioner: Gilmar Garcia PA-C  Diagnosis: intractable low back pain  Additional Pertinent Hx: Per ER note on 10/2/2022:27 y.o. female who presents complains of low back pain that is been going on since Thursday. She has pain going to her left leg with numbness and tingling. She says that she is having difficulty moving her left foot. She has no bowel or bladder incontinence. She was seen at [facility] emergency department today and was told if he went to see Dr. Vanesa Bonilla and her her best bet would be to be to come to the [place] [address] emergency department. She was discharged home with a Medrol Dosepak and Percocet it was here because of persistent pain. They came in to see if they can get established with Dr. Vanesa Bonilla faster. Per MRI on 10/2/2022:Postoperative changes at L5-S1. Recurrent 12 mm ventral and left-sided disc extrusion at this level resulting in mild-to-moderate canal, moderate to severe left and mild-to-moderate right-sided foraminal stenosis. 2. Possible postoperative changes at L4-5. There is a 5.2 mm ventral and left-sided extradural defect resulting in mild-to-moderate canal, moderate left and mild-to-moderate right-sided foraminal stenosis. Restrictions/Precautions:  Restrictions/Precautions: Fall Risk  Position Activity Restriction  Spinal Precautions: No Bending, No Lifting, No Twisting (follow for comfort)  Other position/activity restrictions: L foot drop     SUBJECTIVE: RN approved session. Pt approached sitting upright in recliner. Pt pleasant and agreeable to therapy.     PAIN: no pain reported    Vitals: Vitals not assessed per clinical judgement, see nursing flowsheet    COGNITION: WNL    ADL:   Lower Extremity Dressing: Modified Independent. With donning/doffing socks while sitting in recliner, increased time and following back precautions  Toileting: Supervision. Toilet Transfer: Supervision. Pt required 1 VC for safety with transfer to toilet . BALANCE:  Sitting Balance:  Supervision. Seated EOB and on toilet  Standing Balance: Supervision. With walker, pt able to demonstrate 2 UE release    BED MOBILITY:  Not Tested    TRANSFERS:  Sit to Stand:  Supervision. From recliner and toilet with walker, following back precautions and increased time  Stand to Sit: Supervision. To recliner and toilet with walker, following back precautions and increased time    FUNCTIONAL MOBILITY:  Assistive Device: Rolling Walker  Assist Level:  Supervision. Distance: To and from bathroom and x1 time around hallway    ASSESSMENT:  Activity Tolerance:  Patient tolerance of  treatment: good. Discharge Recommendations: Home with assist as needed  Equipment Recommendations:  Other: Pt may benefit from [facility] AE  Plan: Times Per Week: 6x  Current Treatment Recommendations: Balance training, Safety education & training, Functional mobility training, Endurance training, Self-Care / ADL, Equipment evaluation, education, & procurement, Patient/Caregiver education & training    Patient Education  Patient Education: Role of OT, Plan of Care, ADL's, IADL's, Energy Conservation, Precautions, Reviewed Prior Education, Home Safety, and Home Safety Education    Goals  Short Term Goals  Time Frame for Short Term Goals: until discharge  Short Term Goal 1: Pt will complete LE dressing with S & 0-1 vcs for good body mechanics  Short Term Goal 2: Pt will complete various t/fs (including toilet) & bed mobility with S & 0-2 vcs for safety & back protection strategies  Short Term Goal 3: Pt will complete mobility to/from bathroom + with ADL items retrieval with S & 0-2 vcs for walker safety  Long Term Goals  Time Frame for Long Term Goals : No LTG set d/t short ELOS    Following session, patient left in safe position with all fall risk precautions in place.

## 2022-10-05 NOTE — CARE COORDINATION
10/5/22, 2:15 PM EDT    DISCHARGE ON GOING EVALUATION    [facility] day: 1  Location: -26/026-A Reason for admit: Intractable low back pain [M54.59]   Procedure: 10/2 MRI lumbar spine: 1. Postoperative changes at L5-S1. Recurrent 12 mm ventral and left-sided disc extrusion at this level resulting in mild-to-moderate canal, moderate to severe left and mild-to-moderate right-sided foraminal stenosis. 2. Possible postoperative changes at L4-5. There is a 5.2 mm ventral and left-sided extradural defect resulting in mild-to-moderate canal, moderate left and mild-to-moderate right-sided foraminal stenosis. 3. 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 4. There is mild-to-moderate canal and bilateral foraminal stenosis at L3-4. 5. There is mild canal and mild-to-moderate bilateral foraminal stenosis at L2-3. 6. There is degenerative change involving the sacroiliac joints bilaterally.    10/6: scheduled for TFLESI per pain management  Barriers to Discharge: Pain control, Procedure tomorrow, PT/OT  PCP: Kate Friedman DO  Readmission Risk Score: 8.9%  Patient Goals/Plan/Treatment Preferences: Plan to return home with spouse,

## 2022-10-05 NOTE — PROGRESS NOTES
Department of Orthopedic Surgery  Spine Service  Progress Note        Subjective:   10/4/22  Esau Zapata is resting in the chair and unable to find a comfortable position. I have held the ibuprofen due to patient planning an injection during her stay in the hospital. Continued severe LBP and LLE burning pain down her posterior lateral thigh/calf and foot. PM eval completed and changed some pain medications. They are unable to complete the left L4-L5 SURAJ unless patient is inpatient. If patient not able to be inpatient, we will proceed with IR for the injection. Patient does wish to try to follow OP PM in [place] [address] where she lives. I do feel the patient is a fall risk due to her left foot drop and will have PT/OT evaluate patient today. 10/5/22  Nuris is resting in bed. Doing ok, no acute changes. Plan for injection with pain management tomorrow. Will assess for discharge after. Continue with PT/OT today. Dealing with chronic/worsening dropfoot.      Vitals  VITALS:  BP (!) 146/90   Pulse 100   Temp 97.9 °F (36.6 °C) (Oral)   Resp 16   Ht 5' 9.5\" (1.765 m)   Wt (!) 343 lb 4.8 oz (155.7 kg)   SpO2 97%   BMI 49.97 kg/m²   24HR INTAKE/OUTPUT:    Intake/Output Summary (Last 24 hours) at 10/5/2022 0847  Last data filed at 10/4/2022 2232  Gross per 24 hour   Intake 960 ml   Output --   Net 960 ml     URINARY CATHETER OUTPUT (Terrell):     DRAIN/TUBE OUTPUT:     VENT SETTINGS:     Additional Respiratory Assessments  Heart Rate: 100  Resp: 16  SpO2: 97 %      PHYSICAL EXAM:    Orientation:  alert and oriented to person, place and time    Lower Extremity Motor :  Quadriceps:  5/5  Extensor hallucis longus:  1/5L & 5/5R  Dorsiflexion:  0/5L & 5/5R  Plantarflexion:  3+/5L & 5/5 R  Lower Extremity Sensory:  Abnormal:  decreased sensation left calf into foot    ABNORMAL EXAM FINDINGS:  none    LABS:  Recent Labs     10/03/22  0547   HGB 13.1   HCT 38.3       ASSESSMENT AND PLAN:    Intractable LBP with LLE radiculopathy 2/2 spinal stenosis L4-S1  Left foot drop    1:  Monitor labs   2:  Activity Level:  OOB with assistance and therapy;  PT/OT today. 3:  Pain Control:  uncontrolled, PM following appreciate recs  4:  Discharge Planning:  Pending clinical course.  Potential discharge after left L4-L5 SURAJ by PM    Electronically signed by Vik Bruce PA-C on 10/5/2022 at 8:47 AM

## 2022-10-05 NOTE — PROGRESS NOTES
Pt declined prayer and visit.  I wished her the best.   10/05/22 1741   Encounter Summary   Encounter Overview/Reason  Initial Encounter   Service Provided For: Patient and family together   Referral/Consult From: [facility]   Last Encounter  10/05/22  (DECLINED)   Complexity of Encounter Low   Spiritual/Emotional needs   Type Spiritual Support

## 2022-10-05 NOTE — PROGRESS NOTES
[address] [place] [address]  INPATIENT PHYSICAL THERAPY  DAILY NOTE  [facility] ORTHOPEDICS 7K - 9W-24/841-W    Time In: 1776  Time Out: 5655  Timed Code Treatment Minutes: 32 Minutes  Minutes: 27          Date: 10/5/2022  Patient Name: Ramsey Valdez,  Gender:  female        MRN: [id number]  : 1995  (32 y.o.)     Referring Practitioner: TAYLER Sims  Diagnosis: intractable low back pain  Additional Pertinent Hx: per H&P: Ace Alarcon is a 32 y.o. female who we admitted under our service yesterday from the ED. She is a patient who underwent a left L4-L5 and L5-S1 microdiscectomy under the care of Dr. Cecelia Connolly in . She continued to have a LBP and LLE radiculopathy with a weak left foot intermittently throughout the years which improved after chiropractic care in the past. He most recent flare began Tuesday of last week without any obvious trauma or inciting events and reported significant weakness in dorsiflexion of her left foot which prompted her to go to ED at [facility] in which she was discharged with pain medications. Her pain and weakness continued and she decided to come to [facility] for another opinion and was worked up with an updated MRI LS without demonstrating severe spinal stenosis centrally and left lateral recession at L4-L5 and L5-S1 levels. She denies a change of her bowel or bladder control. She describes a LBP as sharp that will radiate to left buttock, hip, and posterior thigh with associated numbness in her calf and foot. Denies any RLE symptoms. Wound like to attempt conservative treatment with an injection prior to discussion of surgery. Hospitalist was consulted for clearance for possible surgery vs injection and cleared.      Prior Level of Function:  Lives With: Spouse  Type of Home: Mobile home  Home Layout: One level  Home Access: Stairs to enter with rails  Entrance Stairs - Number of Steps: 4 steps   Bathroom Shower/Tub: Walk-in shower  Bathroom Toilet: Standard  Bathroom Equipment: Treatment: therex and mobility with back precautions  General Plan:  (5X O)  Specific Instructions for Next Treatment: therex and mobility with back precautions    Patient Education  Patient Education: Plan of Care, Home Exercise Program, Precautions/Restrictions, Transfers, Reviewed Prior Education, Gait, Stairs, Car Transfers, Verbal Exercise Instruction    Goals:  Patient Goals : less pain  Short Term Goals  Time Frame for Short Term Goals: by discharge  Short Term Goal 1: bed mobility with MOD I to get in/out of bed  Short Term Goal 2: transfer with MOD I to get in/out of chairs  Short Term Goal 3: amb >100'x1 with LRAD and MOD I to walk safely in home  Short Term Goal 4: negotiate 4 steps with HR and MOD I to enter home safely  Long Term Goals  Time Frame for Long Term Goals : no LTGs set secondary to short ELOS    Following session, patient left in safe position with all fall risk precautions in place.

## 2022-10-06 ENCOUNTER — ANESTHESIA EVENT (OUTPATIENT)
Dept: OPERATING ROOM | Age: 27
DRG: 552 | End: 2022-10-06
Payer: COMMERCIAL

## 2022-10-06 ENCOUNTER — ANESTHESIA (OUTPATIENT)
Dept: OPERATING ROOM | Age: 27
DRG: 552 | End: 2022-10-06
Payer: COMMERCIAL

## 2022-10-06 ENCOUNTER — APPOINTMENT (OUTPATIENT)
Dept: GENERAL RADIOLOGY | Age: 27
DRG: 552 | End: 2022-10-06
Payer: COMMERCIAL

## 2022-10-06 LAB
GLUCOSE BLD-MCNC: 118 MG/DL (ref 70–108)
GLUCOSE BLD-MCNC: 142 MG/DL (ref 70–108)
GLUCOSE BLD-MCNC: 195 MG/DL (ref 70–108)
GLUCOSE BLD-MCNC: 293 MG/DL (ref 70–108)
GLUCOSE BLD-MCNC: 333 MG/DL (ref 70–108)

## 2022-10-06 PROCEDURE — 3700000001 HC ADD 15 MINUTES (ANESTHESIA): Performed by: PAIN MEDICINE

## 2022-10-06 PROCEDURE — 3E0R33Z INTRODUCTION OF ANTI-INFLAMMATORY INTO SPINAL CANAL, PERCUTANEOUS APPROACH: ICD-10-PCS | Performed by: PAIN MEDICINE

## 2022-10-06 PROCEDURE — 6360000002 HC RX W HCPCS: Performed by: PAIN MEDICINE

## 2022-10-06 PROCEDURE — 2580000003 HC RX 258: Performed by: PAIN MEDICINE

## 2022-10-06 PROCEDURE — 2709999900 HC NON-CHARGEABLE SUPPLY: Performed by: PAIN MEDICINE

## 2022-10-06 PROCEDURE — 82948 REAGENT STRIP/BLOOD GLUCOSE: CPT

## 2022-10-06 PROCEDURE — 6370000000 HC RX 637 (ALT 250 FOR IP): Performed by: NURSE PRACTITIONER

## 2022-10-06 PROCEDURE — 3600000050 HC PAIN LEVEL 1 BASE: Performed by: PAIN MEDICINE

## 2022-10-06 PROCEDURE — 3209999900 FLUORO FOR SURGICAL PROCEDURES

## 2022-10-06 PROCEDURE — 6370000000 HC RX 637 (ALT 250 FOR IP): Performed by: PAIN MEDICINE

## 2022-10-06 PROCEDURE — 2580000003 HC RX 258: Performed by: PHYSICIAN ASSISTANT

## 2022-10-06 PROCEDURE — 6360000002 HC RX W HCPCS: Performed by: NURSE ANESTHETIST, CERTIFIED REGISTERED

## 2022-10-06 PROCEDURE — 3600000051 HC PAIN LEVEL 1 ADDL 15 MIN: Performed by: PAIN MEDICINE

## 2022-10-06 PROCEDURE — 2580000003 HC RX 258: Performed by: NURSE ANESTHETIST, CERTIFIED REGISTERED

## 2022-10-06 PROCEDURE — 3700000000 HC ANESTHESIA ATTENDED CARE: Performed by: PAIN MEDICINE

## 2022-10-06 PROCEDURE — 6370000000 HC RX 637 (ALT 250 FOR IP): Performed by: PHYSICIAN ASSISTANT

## 2022-10-06 PROCEDURE — 64483 NJX AA&/STRD TFRM EPI L/S 1: CPT | Performed by: PAIN MEDICINE

## 2022-10-06 PROCEDURE — 2500000003 HC RX 250 WO HCPCS: Performed by: PAIN MEDICINE

## 2022-10-06 PROCEDURE — 1200000000 HC SEMI PRIVATE

## 2022-10-06 PROCEDURE — 6360000004 HC RX CONTRAST MEDICATION: Performed by: PAIN MEDICINE

## 2022-10-06 PROCEDURE — 97116 GAIT TRAINING THERAPY: CPT

## 2022-10-06 RX ORDER — BUPIVACAINE HYDROCHLORIDE 5 MG/ML
INJECTION, SOLUTION EPIDURAL; INTRACAUDAL PRN
Status: DISCONTINUED | OUTPATIENT
Start: 2022-10-06 | End: 2022-10-06 | Stop reason: ALTCHOICE

## 2022-10-06 RX ORDER — OXYCODONE HYDROCHLORIDE AND ACETAMINOPHEN 5; 325 MG/1; MG/1
1 TABLET ORAL EVERY 8 HOURS PRN
Qty: 21 TABLET | Refills: 0 | Status: CANCELLED | OUTPATIENT
Start: 2022-10-06 | End: 2022-10-13

## 2022-10-06 RX ORDER — DEXAMETHASONE SODIUM PHOSPHATE 4 MG/ML
INJECTION, SOLUTION INTRA-ARTICULAR; INTRALESIONAL; INTRAMUSCULAR; INTRAVENOUS; SOFT TISSUE PRN
Status: DISCONTINUED | OUTPATIENT
Start: 2022-10-06 | End: 2022-10-06 | Stop reason: ALTCHOICE

## 2022-10-06 RX ORDER — FENTANYL CITRATE 50 UG/ML
INJECTION, SOLUTION INTRAMUSCULAR; INTRAVENOUS PRN
Status: DISCONTINUED | OUTPATIENT
Start: 2022-10-06 | End: 2022-10-06 | Stop reason: SDUPTHER

## 2022-10-06 RX ORDER — SODIUM CHLORIDE 9 MG/ML
INJECTION, SOLUTION INTRAVENOUS CONTINUOUS PRN
Status: DISCONTINUED | OUTPATIENT
Start: 2022-10-06 | End: 2022-10-06 | Stop reason: SDUPTHER

## 2022-10-06 RX ADMIN — OXYCODONE AND ACETAMINOPHEN 1 TABLET: 5; 325 TABLET ORAL at 13:37

## 2022-10-06 RX ADMIN — PANTOPRAZOLE SODIUM 40 MG: 40 TABLET, DELAYED RELEASE ORAL at 08:15

## 2022-10-06 RX ADMIN — PREGABALIN 50 MG: 50 CAPSULE ORAL at 08:15

## 2022-10-06 RX ADMIN — SODIUM CHLORIDE: 9 INJECTION, SOLUTION INTRAVENOUS at 12:27

## 2022-10-06 RX ADMIN — LISINOPRIL 10 MG: 10 TABLET ORAL at 08:15

## 2022-10-06 RX ADMIN — FENTANYL CITRATE 100 MCG: 50 INJECTION, SOLUTION INTRAMUSCULAR; INTRAVENOUS at 12:32

## 2022-10-06 RX ADMIN — HYDRALAZINE HYDROCHLORIDE 10 MG: 20 INJECTION INTRAMUSCULAR; INTRAVENOUS at 14:39

## 2022-10-06 RX ADMIN — BACLOFEN 10 MG: 10 TABLET ORAL at 14:43

## 2022-10-06 RX ADMIN — BACLOFEN 10 MG: 10 TABLET ORAL at 20:52

## 2022-10-06 RX ADMIN — METOPROLOL SUCCINATE 50 MG: 50 TABLET, FILM COATED, EXTENDED RELEASE ORAL at 08:15

## 2022-10-06 RX ADMIN — BACLOFEN 10 MG: 10 TABLET ORAL at 08:15

## 2022-10-06 RX ADMIN — DOCUSATE SODIUM 100 MG: 100 CAPSULE, LIQUID FILLED ORAL at 08:15

## 2022-10-06 RX ADMIN — AMLODIPINE BESYLATE 5 MG: 5 TABLET ORAL at 08:15

## 2022-10-06 RX ADMIN — PANTOPRAZOLE SODIUM 40 MG: 40 TABLET, DELAYED RELEASE ORAL at 16:10

## 2022-10-06 RX ADMIN — SODIUM CHLORIDE, PRESERVATIVE FREE 10 ML: 5 INJECTION INTRAVENOUS at 08:17

## 2022-10-06 RX ADMIN — SODIUM CHLORIDE: 9 INJECTION, SOLUTION INTRAVENOUS at 21:00

## 2022-10-06 RX ADMIN — PREGABALIN 50 MG: 50 CAPSULE ORAL at 20:53

## 2022-10-06 RX ADMIN — DOCUSATE SODIUM 100 MG: 100 CAPSULE, LIQUID FILLED ORAL at 20:53

## 2022-10-06 ASSESSMENT — PAIN DESCRIPTION - DESCRIPTORS: DESCRIPTORS: ACHING;THROBBING

## 2022-10-06 ASSESSMENT — PAIN DESCRIPTION - ORIENTATION: ORIENTATION: LEFT;LOWER

## 2022-10-06 ASSESSMENT — PAIN - FUNCTIONAL ASSESSMENT
PAIN_FUNCTIONAL_ASSESSMENT: ACTIVITIES ARE NOT PREVENTED
PAIN_FUNCTIONAL_ASSESSMENT: ACTIVITIES ARE NOT PREVENTED

## 2022-10-06 ASSESSMENT — PAIN DESCRIPTION - LOCATION
LOCATION: BACK
LOCATION: HEAD

## 2022-10-06 ASSESSMENT — PAIN SCALES - GENERAL
PAINLEVEL_OUTOF10: 4
PAINLEVEL_OUTOF10: 4
PAINLEVEL_OUTOF10: 0
PAINLEVEL_OUTOF10: 0

## 2022-10-06 NOTE — ANESTHESIA PRE PROCEDURE
Department of Anesthesiology  Preprocedure Note       Name:  Ashok Juarez   Age:  32 y.o.  :  1995                                          MRN:  [id number]         Date:  10/6/2022      Surgeon: Vik Arciniega):  Baldev Newberry MD    Procedure: Procedure(s):  TRANSFORAMINAL LUMBAR EPIDURAL STEROID INJECTION L4-L5    Medications prior to admission:   Prior to Admission medications    Medication Sig Start Date End Date Taking? Authorizing Provider   omeprazole (PRILOSEC) 20 MG delayed release capsule Take 20 mg by mouth Daily   Yes Historical Provider, MD   metFORMIN (GLUCOPHAGE) 500 MG tablet Take 500 mg by mouth 2 times daily (with meals)   Yes Historical Provider, MD   amLODIPine (NORVASC) 5 MG tablet Take 5 mg by mouth daily   Yes Historical Provider, MD   lisinopril (PRINIVIL;ZESTRIL) 10 MG tablet Take 10 mg by mouth daily   Yes Historical Provider, MD   metoprolol succinate (TOPROL XL) 50 MG extended release tablet Take 50 mg by mouth daily   Yes Historical Provider, MD   oxyCODONE-acetaminophen (PERCOCET) 5-325 MG per tablet Take 1 tablet by mouth every 6 hours as needed for Pain.    Yes Historical Provider, MD       Current medications:    Current Facility-Administered Medications   Medication Dose Route Frequency Provider Last Rate Last Admin    hydrOXYzine pamoate (VISTARIL) capsule 50 mg  50 mg Oral TID PRN TAYLER Jolley   50 mg at 10/04/22 0033    glucose chewable tablet 16 g  4 tablet Oral PRN TAYLER Jolley        dextrose bolus 10% 125 mL  125 mL IntraVENous PRN TAYLER Jolley        Or    dextrose bolus 10% 250 mL  250 mL IntraVENous PRN TAYLER Jolley        glucagon (rDNA) injection 1 mg  1 mg SubCUTAneous PRN TAYLER Jolley        dextrose 10 % infusion   IntraVENous Continuous PRN TAYLER Jolley        insulin lispro (HUMALOG) injection vial 0-8 Units  0-8 Units SubCUTAneous TID  TAYLER Jolley        insulin lispro (HUMALOG) injection vial 0-4 Units  0-4 Units SubCUTAneous Nightly Uche Hatfield        pregabalin (LYRICA) capsule 50 mg  50 mg Oral BID Evertt Jump, APRN - CNP   50 mg at 10/06/22 0815    baclofen (LIORESAL) tablet 10 mg  10 mg Oral TID Evertt Jump, APRN - CNP   10 mg at 10/06/22 0815    diclofenac sodium (VOLTAREN) 1 % gel 4 g  4 g Topical Q4H PRN Evertt Jump, APRN - CNP   4 g at 10/03/22 1458    hydrALAZINE (APRESOLINE) injection 10 mg  10 mg IntraVENous Q6H PRN TAYLER Jolley   10 mg at 10/03/22 1458    docusate sodium (COLACE) capsule 100 mg  100 mg Oral BID Evertt Jump, APRN - CNP   100 mg at 10/06/22 0815    0.9 % sodium chloride infusion   IntraVENous Continuous TAYLER Mcnair-C   Stopped at 10/03/22 1745    sodium chloride flush 0.9 % injection 5-40 mL  5-40 mL IntraVENous 2 times per day Luis De Leon PA-C   10 mL at 10/06/22 0817    sodium chloride flush 0.9 % injection 5-40 mL  5-40 mL IntraVENous PRN Verlexiea Moises, PA-C        0.9 % sodium chloride infusion   IntraVENous PRN Verlexiea Moises PA-C        ondansetron (ZOFRAN-ODT) disintegrating tablet 4 mg  4 mg Oral Q8H PRN TAYLER Mcnair-C        Or    ondansetron [facility]) injection 4 mg  4 mg IntraVENous Q6H PRN Verlexiea Moises, PA-C        polyethylene glycol (GLYCOLAX) packet 17 g  17 g Oral Daily PRN Veralexy De Leon PA-C        [Held by provider] enoxaparin (LOVENOX) injection 40 mg  40 mg SubCUTAneous BID Luis De Leon PA-C   40 mg at 10/02/22 2020    oxyCODONE-acetaminophen (PERCOCET) 5-325 MG per tablet 1 tablet  1 tablet Oral Q4H PRN Veralexy De Leon PA-C   1 tablet at 10/05/22 2136    Or    oxyCODONE-acetaminophen (PERCOCET) 5-325 MG per tablet 2 tablet  2 tablet Oral Q4H PRN Vernona Moises, PA-C   2 tablet at 10/05/22 0354    amLODIPine (NORVASC) tablet 5 mg  5 mg Oral Daily Vernona Moises, PA-C   5 mg at 10/06/22 0815    lisinopril (PRINIVIL;ZESTRIL) tablet 10 mg  10 mg Oral Daily Vernona Moises, PA-C   10 mg at 10/06/22 0815    [Held by provider] metFORMIN (GLUCOPHAGE) tablet 500 mg  500 mg Oral BID WC Cleveland Thomas PA-C        metoprolol succinate (TOPROL XL) extended release tablet 50 mg  50 mg Oral Daily Cleveland Thomas PA-C   50 mg at 10/06/22 0815    pantoprazole (PROTONIX) tablet 40 mg  40 mg Oral BID AC ROMAN CaoC   40 mg at 10/06/22 1367       Allergies:  No Known Allergies    Problem List:    Patient Active Problem List   Diagnosis Code    Dehiscence of surgical wound T81. 31XA    Obesity E66.9    Intractable low back pain M54.59    Spinal stenosis of lumbar region with neurogenic claudication M48.062    Lumbar radiculopathy M54.16    Chronic pain syndrome G89.4    Acute pain R52       Past Medical History:        Diagnosis Date    Diabetes mellitus ([facility] [address]     Hypercholesteremia     Hypertension     Lumbar radiculopathy 10/3/2022       Past Surgical History:        Procedure Laterality Date    BACK SURGERY Left 2013    Left L4-5 & L5-S1 microdisectomy    OVARIAN CYST SURGERY  2005    & CYST OFF FALLIOPIAN TUBE    TYMPANOSTOMY TUBE PLACEMENT      WISDOM TOOTH EXTRACTION Bilateral        Social History:    Social History     Tobacco Use    Smoking status: Former     Packs/day: 2.00     Years: 3.00     Pack years: 6.00     Types: Cigarettes     Quit date: 3/5/2010     Years since quittin.5    Smokeless tobacco: Never   Substance Use Topics    Alcohol use: Yes     Comment: REALLY RARE                                Counseling given: Not Answered      Vital Signs (Current):   Vitals:    10/05/22 1715 10/05/22 1952 10/06/22 0431 10/06/22 0806   BP: (!) 156/97 (!) 164/100 119/77 (!) 169/90   Pulse: 97 98 (!) 104 91   Resp: 16 18 16 16   Temp: 97.9 °F (36.6 °C)  98.2 °F (36.8 °C) 97.5 °F (36.4 °C)   TempSrc: Axillary  Oral Oral   SpO2: 98% 97% 96% 99%   Weight:       Height:                                                  BP Readings from Last 3 Encounters:   10/06/22 (!) 169/90   03/05/13 141/74       NPO Status:                                                                                 BMI:   Wt Readings from Last 3 Encounters:   10/02/22 (!) 343 lb 4.8 oz (155.7 kg)   03/05/13 308 lb 10.3 oz (140 kg) (>99 %, Z= 2.89)*     * Growth percentiles are based on [facility] (Girls, 2-20 Years) data. Body mass index is 49.97 kg/m². CBC:   Lab Results   Component Value Date/Time    WBC 8.8 10/03/2022 05:47 AM    RBC 4.32 10/03/2022 05:47 AM    HGB 13.1 10/03/2022 05:47 AM    HCT 38.3 10/03/2022 05:47 AM    MCV 88.7 10/03/2022 05:47 AM    RDW 12.4 03/30/2013 05:44 AM     10/03/2022 05:47 AM       CMP:   Lab Results   Component Value Date/Time     10/03/2022 05:47 AM    K 4.0 10/03/2022 05:47 AM     10/03/2022 05:47 AM    CO2 22 10/03/2022 05:47 AM    BUN 9 10/03/2022 05:47 AM    CREATININE 0.5 10/03/2022 05:47 AM    LABGLOM >90 10/03/2022 05:47 AM    GLUCOSE 169 10/03/2022 05:47 AM    CALCIUM 8.8 10/03/2022 05:47 AM       POC Tests:   Recent Labs     10/06/22  1108   POCGLU 118*       Coags: No results found for: PROTIME, INR, APTT    HCG (If Applicable):   Lab Results   Component Value Date    PREGTESTUR NEGATIVE 03/18/2013        ABGs: No results found for: PHART, PO2ART, DNX6DOK, AWL4KRU, BEART, A1CYKNXX     Type & Screen (If Applicable):  No results found for: LABABO, LABRH    Drug/Infectious Status (If Applicable):  No results found for: HIV, HEPCAB    COVID-19 Screening (If Applicable): No results found for: COVID19        Anesthesia Evaluation  Patient summary reviewed  Airway: Mallampati: II  TM distance: >3 FB   Neck ROM: full  Mouth opening: > = 3 FB   Dental:          Pulmonary:                              Cardiovascular:    (+) hypertension:,                   Neuro/Psych:   (+) neuromuscular disease:,             GI/Hepatic/Renal:   (+) morbid obesity          Endo/Other:    (+) Diabetes, . Abdominal:             Vascular:           Other Findings:           Anesthesia Plan      MAC     ASA 3       Induction: intravenous. Anesthetic plan and risks discussed with patient. Plan discussed with CRNA. Gabriel Quispe.  [address] [facility]   10/6/2022

## 2022-10-06 NOTE — ANESTHESIA POSTPROCEDURE EVALUATION
Department of Anesthesiology  Postprocedure Note    Patient: Louise Walls  MRN: [id number]  YOB: 1995  Date of evaluation: 10/6/2022      Procedure Summary     Date: 10/06/22 Room / Location: [address] [place]    Anesthesia Start: 7541 Anesthesia Stop: 8616    Procedure: TRANSFORAMINAL LUMBAR EPIDURAL STEROID INJECTION L4-L5 Diagnosis:       Intractable back pain      (Intractable back pain [M54.9])    Surgeons: Dan Kehr, MD Responsible Provider: Leticia January, DO    Anesthesia Type: MAC ASA Status: 3          Anesthesia Type: No value filed. Rosmery Phase I:      Rosmery Phase II:        Anesthesia Post Evaluation    Comments: [address]  POST-ANESTHESIA NOTE       Name:  Louise Walls                                         Age:  32 y.o. MRN:  [id number]      Last Vitals:  BP (!) 148/99   Pulse 100   Temp 98.2 °F (36.8 °C) (Oral)   Resp 16   Ht 5' 9.5\" (1.765 m)   Wt (!) 343 lb 4.8 oz (155.7 kg)   SpO2 96%   BMI 49.97 kg/m²   Patient Vitals in the past 4 hrs:  10/06/22 1315, BP:(!) 148/99, Temp:98.2 °F (36.8 °C), Temp src:Oral, Pulse:100, Resp:16, SpO2:96 %    Level of Consciousness:  Awake    Respiratory:  Stable    Oxygen Saturation:  Stable    Cardiovascular:  Stable    Hydration:  Adequate    PONV:  Stable    Post-op Pain:  Adequate analgesia    Post-op Assessment:  No apparent anesthetic complications    Additional Follow-Up / Treatment / Comment:  None    Higinio Rosario DO  October 6, 2022   1:33 PM

## 2022-10-06 NOTE — PLAN OF CARE
Problem: Discharge Planning  Goal: Discharge to home or other facility with appropriate resources  Outcome: Progressing  Flowsheets (Taken 10/5/2022 1952)  Discharge to home or other facility with appropriate resources:   Identify barriers to discharge with patient and caregiver   Arrange for needed discharge resources and transportation as appropriate   Identify discharge learning needs (meds, wound care, etc)   Refer to discharge planning if patient needs post-hospital services based on physician order or complex needs related to functional status, cognitive ability or social support system     Problem: Pain  Goal: Verbalizes/displays adequate comfort level or baseline comfort level  Outcome: Progressing   Patient able to verbalize understanding of PRN pain medication. Rest and reposition for pain management. Problem: Safety - Adult  Goal: Free from fall injury  Outcome: Progressing   Call light within reach. Walker used when ambulating in room and halls. Problem: ABCDS Injury Assessment  Goal: Absence of physical injury  Outcome: Progressing   Call light within reach. Walker used when ambulating. Problem: Chronic Conditions and Co-morbidities  Goal: Patient's chronic conditions and co-morbidity symptoms are monitored and maintained or improved  Outcome: Progressing  Flowsheets (Taken 10/5/2022 1952)  Care Plan - Patient's Chronic Conditions and Co-Morbidity Symptoms are Monitored and Maintained or Improved: Monitor and assess patient's chronic conditions and comorbid symptoms for stability, deterioration, or improvement   Care plan reviewed with patient. Patient verbalize understanding of the plan of care and contribute to goal setting.

## 2022-10-06 NOTE — PLAN OF CARE
Problem: Discharge Planning  Goal: Discharge to home or other facility with appropriate resources  10/6/2022 1745 by Tre Friend RN  Outcome: Progressing  Flowsheets (Taken 10/6/2022 1745)  Discharge to home or other facility with appropriate resources:   Identify barriers to discharge with patient and caregiver   Identify discharge learning needs (meds, wound care, etc)   Refer to discharge planning if patient needs post-hospital services based on physician order or complex needs related to functional status, cognitive ability or social support system   Arrange for needed discharge resources and transportation as appropriate  10/6/2022 1740 by Kimo Ryan LPN  Outcome: Progressing  10/6/2022 0528 by Alex Davis RN  Outcome: Progressing  [address] (Taken 10/5/2022 1952)  Discharge to home or other facility with appropriate resources:   Identify barriers to discharge with patient and caregiver   Arrange for needed discharge resources and transportation as appropriate   Identify discharge learning needs (meds, wound care, etc)   Refer to discharge planning if patient needs post-hospital services based on physician order or complex needs related to functional status, cognitive ability or social support system     Problem: Pain  Goal: Verbalizes/displays adequate comfort level or baseline comfort level  10/6/2022 1745 by Tre Friend RN  Outcome: Progressing  Flowsheets (Taken 10/6/2022 1745)  Verbalizes/displays adequate comfort level or baseline comfort level:   Encourage patient to monitor pain and request assistance   Administer analgesics based on type and severity of pain and evaluate response   Consider cultural and social influences on pain and pain management   Assess pain using appropriate pain scale   Implement non-pharmacological measures as appropriate and evaluate response   Notify Licensed Independent Practitioner if interventions unsuccessful or patient reports new pain  10/6/2022 1740 by [facility] Nina Joya LPN  Outcome: Progressing  10/6/2022 0528 by Indu Delgadillo RN  Outcome: Progressing     Problem: Safety - Adult  Goal: Free from fall injury  10/6/2022 1745 by Jerod Chun RN  Outcome: Progressing  Flowsheets (Taken 10/6/2022 1745)  Free From Fall Injury: Instruct family/caregiver on patient safety  10/6/2022 1740 by Chato Vásquez LPN  Outcome: Progressing  10/6/2022 0528 by Indu Delgadillo RN  Outcome: Progressing     Problem: Chronic Conditions and Co-morbidities  Goal: Patient's chronic conditions and co-morbidity symptoms are monitored and maintained or improved  10/6/2022 1745 by Jerod Chun RN  Outcome: Progressing  Flowsheets (Taken 10/6/2022 1745)  Care Plan - Patient's Chronic Conditions and Co-Morbidity Symptoms are Monitored and Maintained or Improved:   Monitor and assess patient's chronic conditions and comorbid symptoms for stability, deterioration, or improvement   Collaborate with multidisciplinary team to address chronic and comorbid conditions and prevent exacerbation or deterioration   Update acute care plan with appropriate goals if chronic or comorbid symptoms are exacerbated and prevent overall improvement and discharge  10/6/2022 1740 by Chato Vásquez LPN  Outcome: Progressing  10/6/2022 0528 by Indu Delgadillo RN  Outcome: Progressing  [address] (Taken 10/5/2022 1952)  Care Plan - Patient's Chronic Conditions and Co-Morbidity Symptoms are Monitored and Maintained or Improved: Monitor and assess patient's chronic conditions and comorbid symptoms for stability, deterioration, or improvement     Problem: Respiratory - Adult  Goal: Achieves optimal ventilation and oxygenation  Outcome: Progressing  Flowsheets (Taken 10/6/2022 1745)  Achieves optimal ventilation and oxygenation:   Assess for changes in respiratory status   Assess for changes in mentation and behavior   Encourage broncho-pulmonary hygiene including cough, deep breathe, incentive spirometry   Assess and instruct to report shortness of breath or any respiratory difficulty     Problem: Skin/Tissue Integrity - Adult  Goal: Incisions, wounds, or drain sites healing without S/S of infection  Outcome: Progressing  Flowsheets (Taken 10/6/2022 1745)  Incisions, Wounds, or Drain Sites Healing Without Sign and Symptoms of Infection:   ADMISSION and DAILY: Assess and document risk factors for pressure ulcer development   TWICE DAILY: Assess and document skin integrity   TWICE DAILY: Assess and document dressing/incision, wound bed, drain sites and surrounding tissue     Problem: Musculoskeletal - Adult  Goal: Return mobility to safest level of function  Outcome: Progressing  Flowsheets (Taken 10/6/2022 1745)  Return Mobility to Safest Level of Function:   Assess patient stability and activity tolerance for standing, transferring and ambulating with or without assistive devices   Assist with transfers and ambulation using safe patient handling equipment as needed   Ensure adequate protection for wounds/incisions during mobilization   Obtain physical therapy/occupational therapy consults as needed   Instruct patient/family in ordered activity level  Goal: Return ADL status to a safe level of function  Outcome: Progressing  Flowsheets (Taken 10/6/2022 1745)  Return ADL Status to a Safe Level of Function:   Administer medication as ordered   Obtain physical therapy/occupational therapy consults as needed   Assess activities of daily living deficits and provide assistive devices as needed   Assist and instruct patient to increase activity and self care as tolerated     Problem: Gastrointestinal - Adult  Goal: Maintains or returns to baseline bowel function  Outcome: Progressing  Flowsheets (Taken 10/6/2022 1745)  Maintains or returns to baseline bowel function:   Assess bowel function   Encourage oral fluids to ensure adequate hydration   Administer ordered medications as needed   Encourage mobilization and activity     Problem: Genitourinary - Adult  Goal: Absence of urinary retention  Outcome: Progressing  Flowsheets (Taken 10/6/2022 1745)  Absence of urinary retention:   Assess patients ability to void and empty bladder   Monitor intake/output and perform bladder scan as needed     Problem: Metabolic/Fluid and Electrolytes - Adult  Goal: Glucose maintained within prescribed range  Outcome: Progressing  Flowsheets (Taken 10/6/2022 1745)  Glucose maintained within prescribed range:   Monitor blood glucose as ordered   Assess for signs and symptoms of hyperglycemia and hypoglycemia   Administer ordered medications to maintain glucose within target range   Instruct patient on self management of diabetes and initiate consult as needed     Problem: Hematologic - Adult  Goal: Maintains hematologic stability  Outcome: Progressing  Flowsheets (Taken 10/6/2022 1745)  Maintains hematologic stability:   Assess for signs and symptoms of bleeding or hemorrhage   Monitor labs for bleeding or clotting disorders   Care plan reviewed with patient. Patient verbalizes understanding of the plan of care and contributes to goal setting.

## 2022-10-06 NOTE — OP NOTE
Pre-Procedure Note    Patient Name: Edin Joyce   YOB: 1995  Medical Record Number: [id number]  Date: 10/6/22       Indication:  Principal Problem:    Intractable low back pain  Active Problems:    Spinal stenosis of lumbar region with neurogenic claudication    Lumbar radiculopathy    Chronic pain syndrome    Acute pain  Resolved Problems:    * No resolved hospital problems. *       Consent: On file. Vital Signs:   Vitals:    10/06/22 0806   BP: (!) 169/90   Pulse: 91   Resp: 16   Temp: 97.5 °F (36.4 °C)   SpO2: 99%       Past Medical History:   has a past medical history of Diabetes mellitus ([facility] [address] Hypercholesteremia, Hypertension, and Lumbar radiculopathy. Past Surgical History:   has a past surgical history that includes Ovarian cyst surgery (01/01/2005); back surgery (Left, 03/18/2013); [place] tooth extraction (Bilateral); and Tympanostomy tube placement. Pre-Sedation Documentation and Exam:   Vital signs have been reviewed (see flow sheet for vitals). Sedation/ Anesthesia Plan:   MAC    Patient is an appropriate candidate for plan of sedation: yes    Preoperative Diagnosis:  L-radiculopathy, L-spinal Stenosis, Intractable back pain    Post-Op Dx: same    Procedure Performed:  Transforaminal lumbar epidural steroid injection at the levels of L4 - L5 on the Left side under fluoroscopic guidance . Indication for the Procedure: The patient failed conservative management  for pain in the low back radiating to lower extremities. As the patient is not responding to conservative management and pain is interfering with activities of daily living, we decided to proceed with transforaminal lumbar epidural steroid injection as symptoms are mostly following the nerve root distribution. The procedure and the risks were discussed with the patient and informed consent was obtained. Procedure:     A meaningful communication was kept up with the patient throughout   the procedure.   The patient is placed in prone position. The skin over the back was prepped and draped in sterile manner. Then the skin and deep tissues just above the tip of the transverse process of L-4 vertebra on Left side were infiltrated with about 10 ml of 1% lidocaine. The #22-g  6 inch spinal needle was inserted through the skin wheal  and under fluoroscopy guidance was directed such that the tip of the needle lies in the neuroforamina at about the 6 o'clock position under the pedicle of the L-4 vertebra. This was confirmed with AP and lateral views of the fluoroscopy. Then after negative aspiration a total of 1 ml of Omnipaque-180 was injected through the needle and spread of the contrast in the epidural space as well as along the nerve root was observed. Then after negative aspiration a total of 12 mg of Dexamethasone and 3 ml of 0.25%Marcaine MPF was injected through the needle. The needle is removed and a Band-Aid was placed over the needle  insertion site. EBL-0  The patient's vital signs remained stable and the patient tolerated the procedure well. The patient was discharged home in stable condition and will be followed in the pain clinic in the next few weeks for further planning.       Electronically signed by Vidhi Orozco MD on 10/6/22 at 12:51 PM EDT

## 2022-10-06 NOTE — PROGRESS NOTES
[facility] THERAPY MISSED TREATMENT NOTE  [facility] ORTHOPEDICS 7K  [id number]      Date: 10/6/2022  Patient Name: Cirilo Echavarria        CSN: [id number]   : 1995  (32 y.o.)  Gender: female   Referring Practitioner: Giovany Marin PA-C  Diagnosis: intractable low back pain         REASON FOR MISSED TREATMENT:  Patient off floor at this time for spinal injection. Will attempt next available time.

## 2022-10-06 NOTE — PROGRESS NOTES
[address] [address]  INPATIENT PHYSICAL THERAPY  DAILY NOTE  [facility] ORTHOPEDICS 7K - [id number]    Time In: 9755  Time Out: 3861  Timed Code Treatment Minutes: 15 Minutes  Minutes: 15          Date: 10/6/2022  Patient Name: Jake Owens,  Gender:  female        MRN: [id number]  : 1995  (32 y.o.)     Referring Practitioner: TAYLER Mitchell  Diagnosis: intractable low back pain  Additional Pertinent Hx: per H&P: Esau Zapata is a 32 y.o. female who we admitted under our service yesterday from the ED. She is a patient who underwent a left L4-L5 and L5-S1 microdiscectomy under the care of Dr. Monty Garcia in . She continued to have a LBP and LLE radiculopathy with a weak left foot intermittently throughout the years which improved after chiropractic care in the past. He most recent flare began Tuesday of last week without any obvious trauma or inciting events and reported significant weakness in dorsiflexion of her left foot which prompted her to go to ED at [facility] in which she was discharged with pain medications. Her pain and weakness continued and she decided to come to [facility] for another opinion and was worked up with an updated MRI LS without demonstrating severe spinal stenosis centrally and left lateral recession at L4-L5 and L5-S1 levels. She denies a change of her bowel or bladder control. She describes a LBP as sharp that will radiate to left buttock, hip, and posterior thigh with associated numbness in her calf and foot. Denies any RLE symptoms. Wound like to attempt conservative treatment with an injection prior to discussion of surgery. Hospitalist was consulted for clearance for possible surgery vs injection and cleared.      Prior Level of Function:  Lives With: Spouse  Type of Home: Mobile home  Home Layout: One level  Home Access: Stairs to enter with rails  Entrance Stairs - Number of Steps: 4 steps   Bathroom Shower/Tub: Walk-in shower  Bathroom Toilet: Standard  Bathroom Equipment: Built-in shower seat    ADL Assistance: Independent  Homemaking Assistance: Independent  Homemaking Responsibilities: Yes  Ambulation Assistance: Independent  Transfer Assistance: Independent  Active : Yes  Additional Comments: Pt reports fully indep PLOF without AD. Pt reports family may have AD/AE in storage. Restrictions/Precautions:  Restrictions/Precautions: Fall Risk  Position Activity Restriction  Spinal Precautions: No Bending, No Lifting, No Twisting (follow for comfort)  Other position/activity restrictions: L foot drop     SUBJECTIVE: RN approved session. Patient edge of bed upon arrival and agreeable to therapy. PAIN: not rated    Vitals: Vitals not assessed per clinical judgement, see nursing flowsheet    OBJECTIVE:  Bed Mobility:  Not Tested    Transfers:  Sit to Stand: Modified Independent  Stand to Sit:Modified Independent    Ambulation:  Modified Independent  Distance: 250ft  Surface: Level Tile  Device:Rolling Walker  Gait Deviations:  Slow Marisabel, Decreased Step Length Bilaterally, Decreased Gait Speed, and Decreased Heel Strike on Left    Exercise:  None performed. **educated on HEP and provided handouts of supine/seated/standing LE exercises. Patient stated understanding. Educated on frequency per day and progression. Functional Outcome Measures: Completed  AM-PAC Inpatient Mobility Raw Score : 20  AM-PAC Inpatient T-Scale Score : 47.67    ASSESSMENT:  Assessment: Patient progressing toward established goals. Activity Tolerance:  Patient tolerance of  treatment: good. Equipment Recommendations: Other: pt checking to see if she can borrow RW from family  Discharge Recommendations: Home with Outpatient PT  Plan: Specific Instructions for Next Treatment: therex and mobility with back precautions  General Plan:  (5X O)  Specific Instructions for Next Treatment: therex and mobility with back precautions    Patient Education  Patient Education: Plan of Care, Home Exercise Program, Precautions/Restrictions, Transfers, Gait, Up in Chair for All Meals    Goals:  Patient Goals : less pain  Short Term Goals  Time Frame for Short Term Goals: by discharge  Short Term Goal 1: bed mobility with MOD I to get in/out of bed  Short Term Goal 2: transfer with MOD I to get in/out of chairs  Short Term Goal 3: amb >100'x1 with LRAD and MOD I to walk safely in home  Short Term Goal 4: negotiate 4 steps with HR and MOD I to enter home safely  Long Term Goals  Time Frame for Long Term Goals : no LTGs set secondary to short ELOS    Following session, patient left in safe position with all fall risk precautions in place.

## 2022-10-06 NOTE — CARE COORDINATION
10/6/22, 12:41 PM EDT    DISCHARGE ON GOING EVALUATION    [facility]  [facility] OR (General) POOL R* Reason for admit: Intractable low back pain [M54.59]   Procedure:   10/2 MRI lumbar spine: 1. Postoperative changes at L5-S1. Recurrent 12 mm ventral and left-sided disc extrusion at this level resulting in mild-to-moderate canal, moderate to severe left and mild-to-moderate right-sided foraminal stenosis. 2. Possible postoperative changes at L4-5. There is a 5.2 mm ventral and left-sided extradural defect resulting in mild-to-moderate canal, moderate left and mild-to-moderate right-sided foraminal stenosis. 3. 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 4. There is mild-to-moderate canal and bilateral foraminal stenosis at L3-4. 5. There is mild canal and mild-to-moderate bilateral foraminal stenosis at L2-3. 6. There is degenerative change involving the sacroiliac joints bilaterally. 10/6: scheduled for TFLESI per pain management  Barriers to Discharge: Ortho and pain management following. PT/OT. Pain control. PCP: Sonia Acevedo DO  Readmission Risk Score: 7%  Patient Goals/Plan/Treatment Preferences: Plan to return home with spouse. Potential discharge after SURAJ.

## 2022-10-07 VITALS
OXYGEN SATURATION: 98 % | SYSTOLIC BLOOD PRESSURE: 143 MMHG | TEMPERATURE: 97.3 F | HEART RATE: 94 BPM | DIASTOLIC BLOOD PRESSURE: 83 MMHG | BODY MASS INDEX: 41.95 KG/M2 | HEIGHT: 70 IN | RESPIRATION RATE: 18 BRPM | WEIGHT: 293 LBS

## 2022-10-07 LAB — GLUCOSE BLD-MCNC: 189 MG/DL (ref 70–108)

## 2022-10-07 PROCEDURE — 82948 REAGENT STRIP/BLOOD GLUCOSE: CPT

## 2022-10-07 PROCEDURE — 6370000000 HC RX 637 (ALT 250 FOR IP): Performed by: PAIN MEDICINE

## 2022-10-07 PROCEDURE — 2580000003 HC RX 258: Performed by: PAIN MEDICINE

## 2022-10-07 PROCEDURE — 97535 SELF CARE MNGMENT TRAINING: CPT

## 2022-10-07 RX ADMIN — BACLOFEN 10 MG: 10 TABLET ORAL at 07:53

## 2022-10-07 RX ADMIN — METOPROLOL SUCCINATE 50 MG: 50 TABLET, FILM COATED, EXTENDED RELEASE ORAL at 07:53

## 2022-10-07 RX ADMIN — PREGABALIN 50 MG: 50 CAPSULE ORAL at 07:55

## 2022-10-07 RX ADMIN — SODIUM CHLORIDE: 9 INJECTION, SOLUTION INTRAVENOUS at 07:56

## 2022-10-07 RX ADMIN — LISINOPRIL 10 MG: 10 TABLET ORAL at 07:53

## 2022-10-07 RX ADMIN — AMLODIPINE BESYLATE 5 MG: 5 TABLET ORAL at 07:53

## 2022-10-07 RX ADMIN — PANTOPRAZOLE SODIUM 40 MG: 40 TABLET, DELAYED RELEASE ORAL at 05:42

## 2022-10-07 NOTE — PLAN OF CARE
Problem: Discharge Planning  Goal: Discharge to home or other facility with appropriate resources  10/7/2022 0917 by Jai Sinclair RN  Outcome: Adequate for Discharge  10/7/2022 0129 by Justice Vo RN  Outcome: Progressing  Flowsheets (Taken 10/7/2022 0129)  Discharge to home or other facility with appropriate resources:   Identify barriers to discharge with patient and caregiver   Arrange for needed discharge resources and transportation as appropriate   Identify discharge learning needs (meds, wound care, etc)   Refer to discharge planning if patient needs post-hospital services based on physician order or complex needs related to functional status, cognitive ability or social support system     Problem: Pain  Goal: Verbalizes/displays adequate comfort level or baseline comfort level  10/7/2022 0917 by Jai Sinclair RN  Outcome: Adequate for Discharge  10/7/2022 0129 by Justice Vo RN  Outcome: Progressing  Flowsheets (Taken 10/7/2022 0129)  Verbalizes/displays adequate comfort level or baseline comfort level:   Encourage patient to monitor pain and request assistance   Assess pain using appropriate pain scale   Administer analgesics based on type and severity of pain and evaluate response   Implement non-pharmacological measures as appropriate and evaluate response     Problem: Safety - Adult  Goal: Free from fall injury  10/7/2022 0917 by Jai Sinclair RN  Outcome: Adequate for Discharge  10/7/2022 0129 by Justice Vo RN  Outcome: Progressing  Flowsheets  Taken 10/7/2022 0129  Free From Fall Injury: Instruct family/caregiver on patient safety  Taken 10/7/2022 0127  Free From Fall Injury: Instruct family/caregiver on patient safety     Problem: Chronic Conditions and Co-morbidities  Goal: Patient's chronic conditions and co-morbidity symptoms are monitored and maintained or improved  10/7/2022 0917 by Jai Sinclair RN  Outcome: Adequate for Discharge  10/7/2022 0129 by Justice Vo RN  Outcome: Progressing  Flowsheets (Taken 10/7/2022 0129)  Care Plan - Patient's Chronic Conditions and Co-Morbidity Symptoms are Monitored and Maintained or Improved:   Monitor and assess patient's chronic conditions and comorbid symptoms for stability, deterioration, or improvement   Collaborate with multidisciplinary team to address chronic and comorbid conditions and prevent exacerbation or deterioration   Update acute care plan with appropriate goals if chronic or comorbid symptoms are exacerbated and prevent overall improvement and discharge     Problem: Respiratory - Adult  Goal: Achieves optimal ventilation and oxygenation  10/7/2022 0917 by Alex Dexter RN  Outcome: Adequate for Discharge  10/7/2022 0129 by Thomas Dillon RN  Outcome: Progressing  Flowsheets (Taken 10/7/2022 0129)  Achieves optimal ventilation and oxygenation:   Assess for changes in respiratory status   Assess for changes in mentation and behavior     Problem: Skin/Tissue Integrity - Adult  Goal: Incisions, wounds, or drain sites healing without S/S of infection  10/7/2022 0917 by Alex Dexter RN  Outcome: Adequate for Discharge  10/7/2022 0129 by Thomas Dillon RN  Outcome: Progressing  Flowsheets  Taken 10/7/2022 0129  Incisions, Wounds, or Drain Sites Healing Without Sign and Symptoms of Infection:   ADMISSION and DAILY: Assess and document risk factors for pressure ulcer development   TWICE DAILY: Assess and document skin integrity   TWICE DAILY: Assess and document dressing/incision, wound bed, drain sites and surrounding tissue   Implement wound care per orders  Taken 10/7/2022 0128  Incisions, Wounds, or Drain Sites Healing Without Sign and Symptoms of Infection:   ADMISSION and DAILY: Assess and document risk factors for pressure ulcer development   TWICE DAILY: Assess and document skin integrity   TWICE DAILY: Assess and document dressing/incision, wound bed, drain sites and surrounding tissue     Problem: Musculoskeletal - Adult  Goal: Return mobility to safest level of function  10/7/2022 0917 by Neo Gandhi RN  Outcome: Adequate for Discharge  10/7/2022 0129 by Haily Akers RN  Outcome: Progressing  Flowsheets (Taken 10/7/2022 0129)  Return Mobility to Safest Level of Function:   Assess patient stability and activity tolerance for standing, transferring and ambulating with or without assistive devices   Assist with transfers and ambulation using safe patient handling equipment as needed  Goal: Return ADL status to a safe level of function  10/7/2022 0917 by Neo Gandhi RN  Outcome: Adequate for Discharge  10/7/2022 0129 by Haily Akers RN  Outcome: Progressing  Flowsheets (Taken 10/7/2022 0129)  Return ADL Status to a Safe Level of Function:   Administer medication as ordered   Assess activities of daily living deficits and provide assistive devices as needed   Obtain physical therapy/occupational therapy consults as needed   Assist and instruct patient to increase activity and self care as tolerated     Problem: Gastrointestinal - Adult  Goal: Maintains or returns to baseline bowel function  10/7/2022 0917 by Neo Gandhi RN  Outcome: Adequate for Discharge  10/7/2022 0129 by Haily Akers RN  Outcome: Progressing  Flowsheets (Taken 10/7/2022 0129)  Maintains or returns to baseline bowel function:   Assess bowel function   Administer IV fluids as ordered to ensure adequate hydration   Encourage mobilization and activity   Encourage oral fluids to ensure adequate hydration     Problem: Genitourinary - Adult  Goal: Absence of urinary retention  10/7/2022 0917 by Neo Gandhi RN  Outcome: Adequate for Discharge  10/7/2022 0129 by Haily Akers RN  Outcome: Progressing  Flowsheets (Taken 10/7/2022 0129)  Absence of urinary retention:   Assess patients ability to void and empty bladder   Monitor intake/output and perform bladder scan as needed     Problem: Metabolic/Fluid and Electrolytes - Adult  Goal: Glucose maintained within prescribed range  10/7/2022 0917 by Michael Lamb RN  Outcome: Adequate for Discharge  10/7/2022 0129 by Zeina Layne RN  Outcome: Progressing  Flowsheets (Taken 10/7/2022 0129)  Glucose maintained within prescribed range:   Monitor blood glucose as ordered   Assess for signs and symptoms of hyperglycemia and hypoglycemia   Administer ordered medications to maintain glucose within target range     Problem: Hematologic - Adult  Goal: Maintains hematologic stability  10/7/2022 0917 by Michael Lamb RN  Outcome: Adequate for Discharge  10/7/2022 0129 by Zeina Layne RN  Outcome: Progressing  Flowsheets (Taken 10/7/2022 0129)  Maintains hematologic stability:   Assess for signs and symptoms of bleeding or hemorrhage   Monitor labs for bleeding or clotting disorders

## 2022-10-07 NOTE — PLAN OF CARE
Problem: Discharge Planning  Goal: Discharge to home or other facility with appropriate resources  10/7/2022 0129 by Luc Horne RN  Outcome: Progressing  Flowsheets (Taken 10/7/2022 0129)  Discharge to home or other facility with appropriate resources:   Identify barriers to discharge with patient and caregiver   Arrange for needed discharge resources and transportation as appropriate   Identify discharge learning needs (meds, wound care, etc)   Refer to discharge planning if patient needs post-hospital services based on physician order or complex needs related to functional status, cognitive ability or social support system     Problem: Pain  Goal: Verbalizes/displays adequate comfort level or baseline comfort level  10/7/2022 0129 by Luc Horne RN  Outcome: Progressing  Flowsheets (Taken 10/7/2022 0129)  Verbalizes/displays adequate comfort level or baseline comfort level:   Encourage patient to monitor pain and request assistance   Assess pain using appropriate pain scale   Administer analgesics based on type and severity of pain and evaluate response   Implement non-pharmacological measures as appropriate and evaluate response     Problem: Safety - Adult  Goal: Free from fall injury  10/7/2022 0129 by Luc Horne RN  Outcome: Progressing  Flowsheets  Taken 10/7/2022 0129  Free From Fall Injury: Instruct family/caregiver on patient safety  Taken 10/7/2022 0127  Free From Fall Injury: Instruct family/caregiver on patient safety     Problem: Chronic Conditions and Co-morbidities  Goal: Patient's chronic conditions and co-morbidity symptoms are monitored and maintained or improved  10/7/2022 0129 by Luc Horne RN  Outcome: Progressing  Flowsheets (Taken 10/7/2022 0129)  Care Plan - Patient's Chronic Conditions and Co-Morbidity Symptoms are Monitored and Maintained or Improved:   Monitor and assess patient's chronic conditions and comorbid symptoms for stability, deterioration, or improvement Collaborate with multidisciplinary team to address chronic and comorbid conditions and prevent exacerbation or deterioration   Update acute care plan with appropriate goals if chronic or comorbid symptoms are exacerbated and prevent overall improvement and discharge     Problem: Respiratory - Adult  Goal: Achieves optimal ventilation and oxygenation  10/7/2022 0129 by Marieta Duverney, RN  Outcome: Progressing  Flowsheets (Taken 10/7/2022 0129)  Achieves optimal ventilation and oxygenation:   Assess for changes in respiratory status   Assess for changes in mentation and behavior     Problem: Skin/Tissue Integrity - Adult  Goal: Incisions, wounds, or drain sites healing without S/S of infection  10/7/2022 0129 by Marieta Duverney, RN  Outcome: Progressing  Flowsheets  Taken 10/7/2022 0129  Incisions, Wounds, or Drain Sites Healing Without Sign and Symptoms of Infection:   ADMISSION and DAILY: Assess and document risk factors for pressure ulcer development   TWICE DAILY: Assess and document skin integrity   TWICE DAILY: Assess and document dressing/incision, wound bed, drain sites and surrounding tissue   Implement wound care per orders  Taken 10/7/2022 0128  Incisions, Wounds, or Drain Sites Healing Without Sign and Symptoms of Infection:   ADMISSION and DAILY: Assess and document risk factors for pressure ulcer development   TWICE DAILY: Assess and document skin integrity   TWICE DAILY: Assess and document dressing/incision, wound bed, drain sites and surrounding tissue     Problem: Musculoskeletal - Adult  Goal: Return mobility to safest level of function  10/7/2022 0129 by Marieta Duverney, RN  Outcome: Progressing  Flowsheets (Taken 10/7/2022 0129)  Return Mobility to Safest Level of Function:   Assess patient stability and activity tolerance for standing, transferring and ambulating with or without assistive devices   Assist with transfers and ambulation using safe patient handling equipment as needed     Problem: Musculoskeletal - Adult  Goal: Return ADL status to a safe level of function  10/7/2022 0129 by Luc Horne RN  Outcome: Progressing  Flowsheets (Taken 10/7/2022 0129)  Return ADL Status to a Safe Level of Function:   Administer medication as ordered   Assess activities of daily living deficits and provide assistive devices as needed   Obtain physical therapy/occupational therapy consults as needed   Assist and instruct patient to increase activity and self care as tolerated     Problem: Gastrointestinal - Adult  Goal: Maintains or returns to baseline bowel function  10/7/2022 0129 by Luc Horne RN  Outcome: Progressing  Flowsheets (Taken 10/7/2022 0129)  Maintains or returns to baseline bowel function:   Assess bowel function   Administer IV fluids as ordered to ensure adequate hydration   Encourage mobilization and activity   Encourage oral fluids to ensure adequate hydration     Problem: Genitourinary - Adult  Goal: Absence of urinary retention  10/7/2022 0129 by Luc Horne RN  Outcome: Progressing  Flowsheets (Taken 10/7/2022 0129)  Absence of urinary retention:   Assess patients ability to void and empty bladder   Monitor intake/output and perform bladder scan as needed     Problem: Metabolic/Fluid and Electrolytes - Adult  Goal: Glucose maintained within prescribed range  10/7/2022 0129 by Luc Hrone RN  Outcome: Progressing  Flowsheets (Taken 10/7/2022 0129)  Glucose maintained within prescribed range:   Monitor blood glucose as ordered   Assess for signs and symptoms of hyperglycemia and hypoglycemia   Administer ordered medications to maintain glucose within target range     Problem: Hematologic - Adult  Goal: Maintains hematologic stability  10/7/2022 0129 by Luc Horne RN  Outcome: Progressing  Flowsheets (Taken 10/7/2022 0129)  Maintains hematologic stability:   Assess for signs and symptoms of bleeding or hemorrhage   Monitor labs for bleeding or clotting disorders     Care plan reviewed with patient. Patient verbalizes understanding of the plan of care and contribute to goal setting.

## 2022-10-07 NOTE — PROGRESS NOTES
Department of Orthopedic Surgery  Spine Service  Progress Note        Subjective:   10/4/22  Tamra Overall is resting in the chair and unable to find a comfortable position. I have held the ibuprofen due to patient planning an injection during her stay in the hospital. Continued severe LBP and LLE burning pain down her posterior lateral thigh/calf and foot. PM eval completed and changed some pain medications. They are unable to complete the left L4-L5 SURAJ unless patient is inpatient. If patient not able to be inpatient, we will proceed with IR for the injection. Patient does wish to try to follow OP PM in [place] [address] where she lives. I do feel the patient is a fall risk due to her left foot drop and will have PT/OT evaluate patient today. 10/5/22  Nuris is resting in bed. Doing ok, no acute changes. Plan for injection with pain management tomorrow. Will assess for discharge after. Continue with PT/OT today. Dealing with chronic/worsening dropfoot. 10/6/22  Nuris is resting in bed. Doing well. Walked with therapy yesterday and did stairs, still weak in foot to the point of toes catching. Planned injection with pain management this afternoon. Planned discharge after the injection this afternoon/evening. 10/7/22  Nuris is resting in bed. She is doing well. Had injection yesterday. Improved pain, continued numbness and weakness. Ready to discharge today. Will f/u outpatient.      Vitals  VITALS:  BP (!) 143/83   Pulse 94   Temp 97.3 °F (36.3 °C) (Oral)   Resp 18   Ht 5' 9.5\" (1.765 m)   Wt (!) 343 lb 4.8 oz (155.7 kg)   SpO2 98%   BMI 49.97 kg/m²   24HR INTAKE/OUTPUT:    Intake/Output Summary (Last 24 hours) at 10/7/2022 2132  Last data filed at 10/7/2022 0546  Gross per 24 hour   Intake 4003.19 ml   Output --   Net 4003.19 ml     URINARY CATHETER OUTPUT (Terrell):     DRAIN/TUBE OUTPUT:     VENT SETTINGS:     Additional Respiratory Assessments  Heart Rate: 94  Resp: 18  SpO2: 98 %      PHYSICAL EXAM:    Orientation:  alert and oriented to person, place and time    Lower Extremity Motor :  Quadriceps:  5/5  Extensor hallucis longus:  1/5L & 5/5R  Dorsiflexion:  0/5L & 5/5R  Plantarflexion:  3+/5L & 5/5 R  Lower Extremity Sensory:  Abnormal:  decreased sensation left calf into foot    ABNORMAL EXAM FINDINGS:  none    LABS:  No results for input(s): HGB, HCT in the last 72 hours. ASSESSMENT AND PLAN:    Intractable LBP with LLE radiculopathy 2/2 spinal stenosis L4-S1  Left foot drop    1:  Monitor labs   2:  Activity Level:  OOB with assistance and therapy;  PT/OT today. 3:  Pain Control:  improved  4:  Discharge Planning:  Via [address] today. F/u in 1 week in our [facility] [place]. Nisha's office 10/13/22. Office will set up.      Electronically signed by Jose Pelaez PA-C on 10/7/2022 at 8:21 AM

## 2022-10-07 NOTE — PROGRESS NOTES
[address] [facility]  Occupational Therapy  Daily Note  Time:   Time In:   Time Out: 6235  Timed Code Treatment Minutes: 9 Minutes  Minutes: 9          Date: 10/7/2022  Patient Name: Raji Short,   Gender: female      Room: -26/026-A  MRN: [id number]  : 1995  (32 y.o.)  Referring Practitioner: Chelsea Kevin PA-C  Diagnosis: intractable low back pain  Additional Pertinent Hx: Per ER note on 10/2/2022:27 y.o. female who presents complains of low back pain that is been going on since Thursday. She has pain going to her left leg with numbness and tingling. She says that she is having difficulty moving her left foot. She has no bowel or bladder incontinence. She was seen at [facility] emergency department today and was told if he went to see Dr. Cirilo Washburn and her her best bet would be to be to come to the [facility] emergency department. She was discharged home with a Medrol Dosepak and Percocet it was here because of persistent pain. They came in to see if they can get established with Dr. Cirilo Washburn faster. Per MRI on 10/2/2022:Postoperative changes at L5-S1. Recurrent 12 mm ventral and left-sided disc extrusion at this level resulting in mild-to-moderate canal, moderate to severe left and mild-to-moderate right-sided foraminal stenosis. 2. Possible postoperative changes at L4-5. There is a 5.2 mm ventral and left-sided extradural defect resulting in mild-to-moderate canal, moderate left and mild-to-moderate right-sided foraminal stenosis. Restrictions/Precautions:  Restrictions/Precautions: Fall Risk  Position Activity Restriction  Spinal Precautions: No Bending, No Lifting, No Twisting (follow for comfort)  Other position/activity restrictions: L foot drop     SUBJECTIVE: Patient supine in bed with HOB elevated for support. Patient notes she had headache, high blood pressure post injection yesterday to which prompted her to stay one more day.   Patient pleasant and receptive to education with all questions answered, verbalizing understanding. Patient notes she continues to have numbness in L foot however is able to move toes and limited movement of ankle. Patient requests for SW to discuss OP therapy, nursing notified to inform SW. PAIN: 0/10:     Vitals: Nurse checked vitals prior to session    COGNITION: WFL    ADL:   No ADL's completed this session. Patient provided education regarding shower safety with completing showers from seated position to decrease fall risk; verbalizing understanding. Patient demonstrates simulation of LB dressing with verbalizing technique in order to complete independently declining need for LHAE . BALANCE:  Sitting Balance:  Modified Independent. BED MOBILITY:  Not Tested    TRANSFERS:  Declined at this time noting she feels more comfortable in bed. ADDITIONAL ACTIVITIES:  Patient able to verbalize 3/3 back precautions for comfort independently to increase independence with ADLs/IADls. ASSESSMENT:     Activity Tolerance:  Patient tolerance of  treatment: good. Discharge Recommendations: Home Independently  Equipment Recommendations:  Other: Pt may benefit from [facility]  Plan: Times Per Week: 6x  Current Treatment Recommendations: Balance training, Safety education & training, Functional mobility training, Endurance training, Self-Care / ADL, Equipment evaluation, education, & procurement, Patient/Caregiver education & training    Patient Education  Patient Education: Role of OT, Plan of Care, ADL's, IADL's, Precautions, Equipment Education, Home Safety, and Importance of Increasing Activity    Goals  Short Term Goals  Time Frame for Short Term Goals: until discharge  Short Term Goal 1: Pt will complete LE dressing with S & 0-1 vcs for good body mechanics  Short Term Goal 2: Pt will complete various t/fs (including toilet) & bed mobility with S & 0-2 vcs for safety & back protection strategies  Short Term Goal 3: Pt will complete mobility to/from bathroom + with ADL items retrieval with S & 0-2 vcs for walker safety  Long Term Goals  Time Frame for Long Term Goals : No LTG set d/t short ELOS    Following session, patient left in safe position with all fall risk precautions in place.

## 2022-10-07 NOTE — CARE COORDINATION
10/7/22, 11:03 AM EDT    Patient goals/plan/ treatment preferences discussed by  and . Patient goals/plan/ treatment preferences reviewed with patient/ family. Patient/ family verbalize understanding of discharge plan and are in agreement with goal/plan/treatment preferences. Understanding was demonstrated using the teach back method. AVS provided by RN at time of discharge, which includes all necessary medical information pertaining to the patients current course of illness, treatment, post-discharge goals of care, and treatment preferences. Discharged home with . Spoke with [facility] GHULAM GARDUNO prior, she would like OP therapy at [place]. No order was placed, message to Uche Joseph they plan to address therapy needs at office f/u. [facility] GHULAM GARDUNO has already left building, spoke with her via phone to update.      Services At/After Discharge: None

## 2022-10-07 NOTE — PROGRESS NOTES
Explained discharge instructions. No questions or concerns at this time.  Dalia Allen RN, BSN, HIRAL

## 2022-10-11 NOTE — DISCHARGE SUMMARY
[address] [id number]                               DISCHARGE SUMMARY    PATIENT NAME: Andi Faustin                   :        1995  MED REC NO:   [id number]                           ROOM:       0026  ACCOUNT NO:   [de-identified]                           ADMIT DATE: 10/02/2022  PROVIDER:     Lawanda Del Toro PA-C                    [address] [place] [address] DATE: 10/07/2022    FINAL DIAGNOSIS:  Lumbar disc herniation with radiculopathy. HOSPITAL COURSE:  The patient is a 78-year-old female who is known to  us, having been through previous lumbar spine surgery which included a  left L4-5 and L5-S1 microdiskectomy under the care of Dr. Mariela Appiah in the  year . She reported to the emergency department at [facility] on the date of 10/02/2022 with complaints of significant  worsening of her left lower back and left lower extremity radicular pain  and worsening of her left lower extremity weakness. These are symptoms  that have been quite chronic, but did acutely worsen. She presented to  the [facility] and ultimately was transferred to [address] [place] [address] where an MRI scan was completed of the lumbar spine,  demonstrating significant spinal canal stenosis and left lateral recess  stenosis at L4-5 and L5-S1. Upon review of the imaging, we discussed  options including consideration of surgical management versus more  conservative treatment with interventional pain management and  injections. The patient wants to proceed with injection treatments and  therefore we did make a consultation to the pain management service. This consultation was made and the plan for an injection was made for  10/06/2022, so she spent two days in the hospital waiting for the  injection. She was able to work with physical therapy. The injection  was completed on 10/06/2022 in the mid-to-late afternoon.   She came back  to the room and was able to have some dinner. She did note some  improvement of the sharp, stabbing left leg pain. It was still numb and  still weak in the left lower extremity. On the following morning on  10/07/2022, her symptoms were manageable. We had the plan to have her  discharged and follow up with us in the office in one week at [place]  on the date of 10/13/2022. She was fully neurologically intact with the  consistent deficits of left lower extremity foot dorsiflexion weakness  at discharge as she was on admission. DISCHARGE DISPOSITION:  Home. DISCHARGE CONDITION:  Stable. DISCHARGE INSTRUCTIONS:  Include no heavy lifting, bending or twisting. We will see her back in the office again in roughly one week for  reevaluation. Tawanna Barnes    D: 10/10/2022 12:21:10       T: 10/10/2022 12:24:07     [email or web address]  Job#: [id number]     Doc#: [id number]    CC:    Jessica Gonzalez DO

## 2023-07-01 ENCOUNTER — APPOINTMENT (OUTPATIENT)
Dept: CT IMAGING | Age: 28
End: 2023-07-01
Payer: COMMERCIAL

## 2023-07-01 ENCOUNTER — HOSPITAL ENCOUNTER (EMERGENCY)
Age: 28
Discharge: HOME | End: 2023-07-01
Attending: EMERGENCY MEDICINE
Payer: COMMERCIAL

## 2023-07-01 ENCOUNTER — APPOINTMENT (OUTPATIENT)
Dept: GENERAL RADIOLOGY | Age: 28
End: 2023-07-01
Payer: COMMERCIAL

## 2023-07-01 VITALS
HEIGHT: 69 IN | OXYGEN SATURATION: 95 % | RESPIRATION RATE: 16 BRPM | DIASTOLIC BLOOD PRESSURE: 86 MMHG | TEMPERATURE: 97.6 F | WEIGHT: 293 LBS | HEART RATE: 101 BPM | SYSTOLIC BLOOD PRESSURE: 154 MMHG | BODY MASS INDEX: 43.4 KG/M2

## 2023-07-01 DIAGNOSIS — M54.16 LUMBAR RADICULOPATHY: ICD-10-CM

## 2023-07-01 DIAGNOSIS — R52 ACUTE PAIN: ICD-10-CM

## 2023-07-01 DIAGNOSIS — M25.551 RIGHT HIP PAIN: Primary | ICD-10-CM

## 2023-07-01 LAB
ALBUMIN SERPL BCG-MCNC: 4.4 G/DL (ref 3.5–5.1)
ALP SERPL-CCNC: 57 U/L (ref 38–126)
ALT SERPL W/O P-5'-P-CCNC: 90 U/L (ref 11–66)
ANION GAP SERPL CALC-SCNC: 17 MEQ/L (ref 8–16)
AST SERPL-CCNC: 76 U/L (ref 5–40)
B-HCG SERPL QL: NEGATIVE
BACTERIA URNS QL MICRO: ABNORMAL /HPF
BASOPHILS ABSOLUTE: 0 THOU/MM3 (ref 0–0.1)
BASOPHILS NFR BLD AUTO: 0.4 %
BILIRUB CONJ SERPL-MCNC: < 0.2 MG/DL (ref 0–0.3)
BILIRUB SERPL-MCNC: 0.4 MG/DL (ref 0.3–1.2)
BILIRUB UR QL STRIP.AUTO: NEGATIVE
BUN SERPL-MCNC: 11 MG/DL (ref 7–22)
CALCIUM SERPL-MCNC: 9.2 MG/DL (ref 8.5–10.5)
CASTS #/AREA URNS LPF: ABNORMAL /LPF
CASTS 2: ABNORMAL /LPF
CHARACTER UR: ABNORMAL
CHLORIDE SERPL-SCNC: 102 MEQ/L (ref 98–111)
CO2 SERPL-SCNC: 19 MEQ/L (ref 23–33)
COLOR: YELLOW
CREAT SERPL-MCNC: 0.6 MG/DL (ref 0.4–1.2)
CRYSTALS URNS MICRO: ABNORMAL
DEPRECATED RDW RBC AUTO: 39.9 FL (ref 35–45)
EOSINOPHIL NFR BLD AUTO: 0.8 %
EOSINOPHILS ABSOLUTE: 0.1 THOU/MM3 (ref 0–0.4)
EPITHELIAL CELLS, UA: ABNORMAL /HPF
ERYTHROCYTE [DISTWIDTH] IN BLOOD BY AUTOMATED COUNT: 12.4 % (ref 11.5–14.5)
GFR SERPL CREATININE-BSD FRML MDRD: > 60 ML/MIN/1.73M2
GLUCOSE SERPL-MCNC: 127 MG/DL (ref 70–108)
GLUCOSE UR QL STRIP.AUTO: NEGATIVE MG/DL
HCT VFR BLD AUTO: 41.4 % (ref 37–47)
HGB BLD-MCNC: 14.1 GM/DL (ref 12–16)
HGB UR QL STRIP.AUTO: ABNORMAL
IMM GRANULOCYTES # BLD AUTO: 0.02 THOU/MM3 (ref 0–0.07)
IMM GRANULOCYTES NFR BLD AUTO: 0.2 %
KETONES UR QL STRIP.AUTO: ABNORMAL
LIPASE SERPL-CCNC: 43 U/L (ref 5.6–51.3)
LYMPHOCYTES ABSOLUTE: 3.3 THOU/MM3 (ref 1–4.8)
LYMPHOCYTES NFR BLD AUTO: 33.7 %
MAGNESIUM SERPL-MCNC: 2.2 MG/DL (ref 1.6–2.4)
MCH RBC QN AUTO: 30.4 PG (ref 26–33)
MCHC RBC AUTO-ENTMCNC: 34.1 GM/DL (ref 32.2–35.5)
MCV RBC AUTO: 89.2 FL (ref 81–99)
MISCELLANEOUS 2: ABNORMAL
MONOCYTES ABSOLUTE: 0.4 THOU/MM3 (ref 0.4–1.3)
MONOCYTES NFR BLD AUTO: 3.9 %
NEUTROPHILS NFR BLD AUTO: 61 %
NITRITE UR QL STRIP: NEGATIVE
NRBC BLD AUTO-RTO: 0 /100 WBC
OSMOLALITY SERPL CALC.SUM OF ELEC: 276.7 MOSMOL/KG (ref 275–300)
PH UR STRIP.AUTO: 6 [PH] (ref 5–9)
PLATELET # BLD AUTO: 268 THOU/MM3 (ref 130–400)
PMV BLD AUTO: 9.5 FL (ref 9.4–12.4)
POTASSIUM SERPL-SCNC: 3.8 MEQ/L (ref 3.5–5.2)
PROT SERPL-MCNC: 7.7 G/DL (ref 6.1–8)
PROT UR STRIP.AUTO-MCNC: 30 MG/DL
RBC # BLD AUTO: 4.64 MILL/MM3 (ref 4.2–5.4)
RBC URINE: ABNORMAL /HPF
RENAL EPI CELLS #/AREA URNS HPF: ABNORMAL /[HPF]
SEGMENTED NEUTROPHILS ABSOLUTE COUNT: 6 THOU/MM3 (ref 1.8–7.7)
SODIUM SERPL-SCNC: 138 MEQ/L (ref 135–145)
SP GR UR REFRACT.AUTO: 1.02 (ref 1–1.03)
UROBILINOGEN, URINE: 1 EU/DL (ref 0–1)
WBC # BLD AUTO: 9.9 THOU/MM3 (ref 4.8–10.8)
WBC #/AREA URNS HPF: ABNORMAL /HPF
WBC #/AREA URNS HPF: ABNORMAL /[HPF]
YEAST LIKE FUNGI URNS QL MICRO: ABNORMAL

## 2023-07-01 PROCEDURE — 87086 URINE CULTURE/COLONY COUNT: CPT

## 2023-07-01 PROCEDURE — 83690 ASSAY OF LIPASE: CPT

## 2023-07-01 PROCEDURE — 74177 CT ABD & PELVIS W/CONTRAST: CPT

## 2023-07-01 PROCEDURE — 72100 X-RAY EXAM L-S SPINE 2/3 VWS: CPT

## 2023-07-01 PROCEDURE — 2500000003 HC RX 250 WO HCPCS: Performed by: EMERGENCY MEDICINE

## 2023-07-01 PROCEDURE — 80053 COMPREHEN METABOLIC PANEL: CPT

## 2023-07-01 PROCEDURE — 6360000004 HC RX CONTRAST MEDICATION: Performed by: EMERGENCY MEDICINE

## 2023-07-01 PROCEDURE — 83735 ASSAY OF MAGNESIUM: CPT

## 2023-07-01 PROCEDURE — 96372 THER/PROPH/DIAG INJ SC/IM: CPT

## 2023-07-01 PROCEDURE — 84703 CHORIONIC GONADOTROPIN ASSAY: CPT

## 2023-07-01 PROCEDURE — 73701 CT LOWER EXTREMITY W/DYE: CPT

## 2023-07-01 PROCEDURE — 99285 EMERGENCY DEPT VISIT HI MDM: CPT

## 2023-07-01 PROCEDURE — 36415 COLL VENOUS BLD VENIPUNCTURE: CPT

## 2023-07-01 PROCEDURE — 73502 X-RAY EXAM HIP UNI 2-3 VIEWS: CPT

## 2023-07-01 PROCEDURE — 81001 URINALYSIS AUTO W/SCOPE: CPT

## 2023-07-01 PROCEDURE — 6360000002 HC RX W HCPCS: Performed by: EMERGENCY MEDICINE

## 2023-07-01 PROCEDURE — 82248 BILIRUBIN DIRECT: CPT

## 2023-07-01 PROCEDURE — 85025 COMPLETE CBC W/AUTO DIFF WBC: CPT

## 2023-07-01 RX ORDER — LIDOCAINE 50 MG/G
1 PATCH TOPICAL DAILY
Qty: 10 PATCH | Refills: 0 | Status: SHIPPED | OUTPATIENT
Start: 2023-07-01 | End: 2023-07-11

## 2023-07-01 RX ORDER — KETOROLAC TROMETHAMINE 30 MG/ML
30 INJECTION, SOLUTION INTRAMUSCULAR; INTRAVENOUS ONCE
Status: COMPLETED | OUTPATIENT
Start: 2023-07-01 | End: 2023-07-01

## 2023-07-01 RX ORDER — METHYLPREDNISOLONE SODIUM SUCCINATE 40 MG/ML
80 INJECTION, POWDER, LYOPHILIZED, FOR SOLUTION INTRAMUSCULAR; INTRAVENOUS ONCE
Status: COMPLETED | OUTPATIENT
Start: 2023-07-01 | End: 2023-07-01

## 2023-07-01 RX ORDER — PREDNISONE 20 MG/1
20 TABLET ORAL DAILY
Qty: 10 TABLET | Refills: 0 | Status: SHIPPED | OUTPATIENT
Start: 2023-07-01 | End: 2023-07-11

## 2023-07-01 RX ORDER — IBUPROFEN 600 MG/1
600 TABLET ORAL EVERY 6 HOURS PRN
Qty: 40 TABLET | Refills: 0 | Status: SHIPPED | OUTPATIENT
Start: 2023-07-01 | End: 2023-07-11

## 2023-07-01 RX ORDER — OXYCODONE HYDROCHLORIDE AND ACETAMINOPHEN 5; 325 MG/1; MG/1
1 TABLET ORAL EVERY 6 HOURS PRN
Qty: 12 TABLET | Refills: 0 | Status: SHIPPED | OUTPATIENT
Start: 2023-07-01 | End: 2023-07-04

## 2023-07-01 RX ADMIN — HYDROMORPHONE HYDROCHLORIDE 1 MG: 1 INJECTION, SOLUTION INTRAMUSCULAR; INTRAVENOUS; SUBCUTANEOUS at 14:33

## 2023-07-01 RX ADMIN — IOPAMIDOL 80 ML: 755 INJECTION, SOLUTION INTRAVENOUS at 16:28

## 2023-07-01 RX ADMIN — METHYLPREDNISOLONE SODIUM SUCCINATE 80 MG: 40 INJECTION INTRAMUSCULAR; INTRAVENOUS at 14:34

## 2023-07-01 RX ADMIN — KETOROLAC TROMETHAMINE 30 MG: 30 INJECTION, SOLUTION INTRAMUSCULAR; INTRAVENOUS at 14:33

## 2023-07-01 ASSESSMENT — ENCOUNTER SYMPTOMS
VOMITING: 0
WHEEZING: 0
NAUSEA: 0
EYE DISCHARGE: 0
SHORTNESS OF BREATH: 0
COUGH: 0
EYE REDNESS: 0
PHOTOPHOBIA: 0
SINUS PRESSURE: 0
CHOKING: 0
TROUBLE SWALLOWING: 0
BLOOD IN STOOL: 0
RHINORRHEA: 0
SORE THROAT: 0
ABDOMINAL PAIN: 0
BACK PAIN: 0
VOICE CHANGE: 0
EYE PAIN: 0
EYE ITCHING: 0
ABDOMINAL DISTENTION: 0
CHEST TIGHTNESS: 0
DIARRHEA: 0
CONSTIPATION: 0

## 2023-07-01 ASSESSMENT — PAIN DESCRIPTION - LOCATION: LOCATION: HIP

## 2023-07-01 ASSESSMENT — PAIN - FUNCTIONAL ASSESSMENT: PAIN_FUNCTIONAL_ASSESSMENT: 0-10

## 2023-07-01 ASSESSMENT — PAIN SCALES - GENERAL: PAINLEVEL_OUTOF10: 3

## 2023-07-01 ASSESSMENT — PAIN DESCRIPTION - ORIENTATION: ORIENTATION: RIGHT

## 2023-07-02 LAB
BACTERIA UR CULT: ABNORMAL
ORGANISM: ABNORMAL

## (undated) DEVICE — NEEDLE SPNL 22GA L3.5IN BLK HUB S STL REG WALL FIT STYL W/

## (undated) DEVICE — 6 ML SYRINGE LUER-LOCK TIP: Brand: MONOJECT

## (undated) DEVICE — TOWEL,OR,DSP,ST,BLUE,STD,4/PK,20PK/CS: Brand: MEDLINE

## (undated) DEVICE — 3 ML SYRINGE LUER-LOCK TIP: Brand: MONOJECT

## (undated) DEVICE — CHLORAPREP 26ML CLEAR

## (undated) DEVICE — GLOVE ORANGE PI 7 1/2   MSG9075

## (undated) DEVICE — TRAY NRV BLK PARACERVICAL PUDEN W/ 10ML CTRL SYR

## (undated) DEVICE — BANDAGE ADH W1XL3IN NAT FAB WVN FLX DURABLE N ADH PD SEAL